# Patient Record
Sex: MALE | Race: WHITE | NOT HISPANIC OR LATINO | URBAN - METROPOLITAN AREA
[De-identification: names, ages, dates, MRNs, and addresses within clinical notes are randomized per-mention and may not be internally consistent; named-entity substitution may affect disease eponyms.]

---

## 2017-04-29 ENCOUNTER — OUTPATIENT (OUTPATIENT)
Dept: OUTPATIENT SERVICES | Facility: HOSPITAL | Age: 67
LOS: 1 days | Discharge: HOME | End: 2017-04-29

## 2017-06-28 DIAGNOSIS — R03.0 ELEVATED BLOOD-PRESSURE READING, WITHOUT DIAGNOSIS OF HYPERTENSION: ICD-10-CM

## 2017-06-28 DIAGNOSIS — E78.5 HYPERLIPIDEMIA, UNSPECIFIED: ICD-10-CM

## 2017-09-01 ENCOUNTER — OUTPATIENT (OUTPATIENT)
Dept: OUTPATIENT SERVICES | Facility: HOSPITAL | Age: 67
LOS: 1 days | Discharge: HOME | End: 2017-09-01

## 2017-09-01 DIAGNOSIS — E11.9 TYPE 2 DIABETES MELLITUS WITHOUT COMPLICATIONS: ICD-10-CM

## 2017-09-01 DIAGNOSIS — E78.5 HYPERLIPIDEMIA, UNSPECIFIED: ICD-10-CM

## 2017-09-01 DIAGNOSIS — I10 ESSENTIAL (PRIMARY) HYPERTENSION: ICD-10-CM

## 2017-10-17 ENCOUNTER — OUTPATIENT (OUTPATIENT)
Dept: OUTPATIENT SERVICES | Facility: HOSPITAL | Age: 67
LOS: 1 days | Discharge: HOME | End: 2017-10-17

## 2017-10-17 DIAGNOSIS — N40.1 BENIGN PROSTATIC HYPERPLASIA WITH LOWER URINARY TRACT SYMPTOMS: ICD-10-CM

## 2018-01-02 ENCOUNTER — OUTPATIENT (OUTPATIENT)
Dept: OUTPATIENT SERVICES | Facility: HOSPITAL | Age: 68
LOS: 1 days | Discharge: HOME | End: 2018-01-02

## 2018-01-02 DIAGNOSIS — E78.5 HYPERLIPIDEMIA, UNSPECIFIED: ICD-10-CM

## 2018-01-02 DIAGNOSIS — E11.9 TYPE 2 DIABETES MELLITUS WITHOUT COMPLICATIONS: ICD-10-CM

## 2018-01-02 DIAGNOSIS — I10 ESSENTIAL (PRIMARY) HYPERTENSION: ICD-10-CM

## 2018-01-21 ENCOUNTER — OUTPATIENT (OUTPATIENT)
Dept: OUTPATIENT SERVICES | Facility: HOSPITAL | Age: 68
LOS: 1 days | Discharge: HOME | End: 2018-01-21

## 2018-01-21 DIAGNOSIS — R10.9 UNSPECIFIED ABDOMINAL PAIN: ICD-10-CM

## 2018-02-07 ENCOUNTER — OUTPATIENT (OUTPATIENT)
Dept: OUTPATIENT SERVICES | Facility: HOSPITAL | Age: 68
LOS: 1 days | Discharge: HOME | End: 2018-02-07

## 2018-02-07 DIAGNOSIS — R10.10 UPPER ABDOMINAL PAIN, UNSPECIFIED: ICD-10-CM

## 2018-05-01 ENCOUNTER — OUTPATIENT (OUTPATIENT)
Dept: OUTPATIENT SERVICES | Facility: HOSPITAL | Age: 68
LOS: 1 days | Discharge: HOME | End: 2018-05-01

## 2018-05-01 DIAGNOSIS — E78.5 HYPERLIPIDEMIA, UNSPECIFIED: ICD-10-CM

## 2018-05-01 DIAGNOSIS — E11.9 TYPE 2 DIABETES MELLITUS WITHOUT COMPLICATIONS: ICD-10-CM

## 2018-05-01 DIAGNOSIS — I10 ESSENTIAL (PRIMARY) HYPERTENSION: ICD-10-CM

## 2018-07-31 ENCOUNTER — APPOINTMENT (OUTPATIENT)
Dept: UROLOGY | Facility: CLINIC | Age: 68
End: 2018-07-31
Payer: MEDICARE

## 2018-07-31 VITALS
DIASTOLIC BLOOD PRESSURE: 82 MMHG | SYSTOLIC BLOOD PRESSURE: 147 MMHG | HEART RATE: 93 BPM | BODY MASS INDEX: 31.07 KG/M2 | HEIGHT: 68 IN | WEIGHT: 205 LBS

## 2018-07-31 LAB
BILIRUB UR QL STRIP: NORMAL
CLARITY UR: CLEAR
COLLECTION METHOD: NORMAL
GLUCOSE UR-MCNC: NORMAL
HCG UR QL: NORMAL EU/DL
HGB UR QL STRIP.AUTO: NORMAL
KETONES UR-MCNC: NORMAL
LEUKOCYTE ESTERASE UR QL STRIP: NORMAL
NITRITE UR QL STRIP: NORMAL
PH UR STRIP: 5
PROT UR STRIP-MCNC: NORMAL
SP GR UR STRIP: 1.01

## 2018-07-31 PROCEDURE — 81003 URINALYSIS AUTO W/O SCOPE: CPT | Mod: QW

## 2018-07-31 PROCEDURE — 99213 OFFICE O/P EST LOW 20 MIN: CPT

## 2018-07-31 RX ORDER — DUTASTERIDE 0.5 MG/1
0.5 CAPSULE, LIQUID FILLED ORAL
Refills: 0 | Status: ACTIVE | COMMUNITY

## 2018-07-31 RX ORDER — EZETIMIBE AND SIMVASTATIN 10; 20 MG/1; MG/1
10-20 TABLET ORAL
Refills: 0 | Status: ACTIVE | COMMUNITY

## 2018-07-31 RX ORDER — GLIMEPIRIDE 4 MG/1
TABLET ORAL
Refills: 0 | Status: ACTIVE | COMMUNITY

## 2018-09-27 ENCOUNTER — OUTPATIENT (OUTPATIENT)
Dept: OUTPATIENT SERVICES | Facility: HOSPITAL | Age: 68
LOS: 1 days | Discharge: HOME | End: 2018-09-27

## 2018-09-27 DIAGNOSIS — E78.5 HYPERLIPIDEMIA, UNSPECIFIED: ICD-10-CM

## 2018-09-27 DIAGNOSIS — I10 ESSENTIAL (PRIMARY) HYPERTENSION: ICD-10-CM

## 2018-09-27 DIAGNOSIS — E11.9 TYPE 2 DIABETES MELLITUS WITHOUT COMPLICATIONS: ICD-10-CM

## 2018-10-05 ENCOUNTER — RX RENEWAL (OUTPATIENT)
Age: 68
End: 2018-10-05

## 2019-01-22 ENCOUNTER — OUTPATIENT (OUTPATIENT)
Dept: OUTPATIENT SERVICES | Facility: HOSPITAL | Age: 69
LOS: 1 days | Discharge: HOME | End: 2019-01-22

## 2019-01-22 DIAGNOSIS — N40.1 BENIGN PROSTATIC HYPERPLASIA WITH LOWER URINARY TRACT SYMPTOMS: ICD-10-CM

## 2019-01-23 LAB — PSA SERPL-MCNC: 1.18 NG/ML

## 2019-02-01 ENCOUNTER — APPOINTMENT (OUTPATIENT)
Dept: UROLOGY | Facility: CLINIC | Age: 69
End: 2019-02-01
Payer: MEDICARE

## 2019-02-01 VITALS
HEART RATE: 100 BPM | BODY MASS INDEX: 31.07 KG/M2 | HEIGHT: 68 IN | SYSTOLIC BLOOD PRESSURE: 137 MMHG | DIASTOLIC BLOOD PRESSURE: 85 MMHG | WEIGHT: 205 LBS

## 2019-02-01 PROCEDURE — 99213 OFFICE O/P EST LOW 20 MIN: CPT

## 2019-02-01 NOTE — ASSESSMENT
[FreeTextEntry1] : This is a 68-year-old male with a history of BPH currently managed on tamsulosin/Avodart. He does have some break through symptoms however, non-bothersome to him at this time. His PSA is 1.18 ng/mL and stable from prior values. Urine testing is negative for blood. At this time, he'll follow up in 6 months for review.

## 2019-02-21 ENCOUNTER — OUTPATIENT (OUTPATIENT)
Dept: OUTPATIENT SERVICES | Facility: HOSPITAL | Age: 69
LOS: 1 days | Discharge: HOME | End: 2019-02-21

## 2019-02-21 DIAGNOSIS — Z79.4 LONG TERM (CURRENT) USE OF INSULIN: ICD-10-CM

## 2019-02-21 DIAGNOSIS — E78.2 MIXED HYPERLIPIDEMIA: ICD-10-CM

## 2019-02-21 DIAGNOSIS — E11.9 TYPE 2 DIABETES MELLITUS WITHOUT COMPLICATIONS: ICD-10-CM

## 2019-02-21 DIAGNOSIS — I10 ESSENTIAL (PRIMARY) HYPERTENSION: ICD-10-CM

## 2019-07-02 ENCOUNTER — OUTPATIENT (OUTPATIENT)
Dept: OUTPATIENT SERVICES | Facility: HOSPITAL | Age: 69
LOS: 1 days | Discharge: HOME | End: 2019-07-02

## 2019-07-02 DIAGNOSIS — E11.9 TYPE 2 DIABETES MELLITUS WITHOUT COMPLICATIONS: ICD-10-CM

## 2019-07-02 DIAGNOSIS — E78.2 MIXED HYPERLIPIDEMIA: ICD-10-CM

## 2019-07-02 DIAGNOSIS — I10 ESSENTIAL (PRIMARY) HYPERTENSION: ICD-10-CM

## 2019-08-01 ENCOUNTER — OUTPATIENT (OUTPATIENT)
Dept: OUTPATIENT SERVICES | Facility: HOSPITAL | Age: 69
LOS: 1 days | Discharge: HOME | End: 2019-08-01

## 2019-08-01 ENCOUNTER — LABORATORY RESULT (OUTPATIENT)
Age: 69
End: 2019-08-01

## 2019-08-01 DIAGNOSIS — Z00.00 ENCOUNTER FOR GENERAL ADULT MEDICAL EXAMINATION WITHOUT ABNORMAL FINDINGS: ICD-10-CM

## 2019-09-24 ENCOUNTER — APPOINTMENT (OUTPATIENT)
Dept: UROLOGY | Facility: CLINIC | Age: 69
End: 2019-09-24
Payer: MEDICARE

## 2019-09-24 VITALS — HEIGHT: 68 IN | WEIGHT: 205 LBS | BODY MASS INDEX: 31.07 KG/M2

## 2019-09-24 PROCEDURE — 99213 OFFICE O/P EST LOW 20 MIN: CPT

## 2019-09-24 NOTE — HISTORY OF PRESENT ILLNESS
[FreeTextEntry1] : 69-year-old with history of BPH and lower urinary tract symptoms currently on tamsulosin 0.4 daily and Avodart 0.5 daily. He has less nocturia than 6 months ago with nocturia x1-2. There is no urinary frequency, no urgency, no gross hematuria, no dysuria. There is no flank pain. Current PSA is 1.44 compared to 1.18 6 months ago.

## 2019-09-24 NOTE — PHYSICAL EXAM
[General Appearance - In No Acute Distress] : no acute distress [Prostate Tenderness] : the prostate was not tender [No Prostate Nodules] : no prostate nodules [Prostate Size ___ (0-4)] : prostate size [unfilled] (scale: 0-4) [] : no respiratory distress [Respiration, Rhythm And Depth] : normal respiratory rhythm and effort [Oriented To Time, Place, And Person] : oriented to person, place, and time [Normal Station and Gait] : the gait and station were normal for the patient's age [Not Anxious] : not anxious

## 2019-09-24 NOTE — REVIEW OF SYSTEMS
[Fever] : no fever [Shortness Of Breath] : no shortness of breath [Chest Pain] : no chest pain [Abdominal Pain] : no abdominal pain [Cough] : no cough [Constipation] : no constipation [Diarrhea] : no diarrhea [Dysuria] : no dysuria [Confused] : no confusion

## 2019-10-18 ENCOUNTER — RX RENEWAL (OUTPATIENT)
Age: 69
End: 2019-10-18

## 2020-02-03 ENCOUNTER — RX RENEWAL (OUTPATIENT)
Age: 70
End: 2020-02-03

## 2020-03-10 ENCOUNTER — LABORATORY RESULT (OUTPATIENT)
Age: 70
End: 2020-03-10

## 2020-03-23 ENCOUNTER — OUTPATIENT (OUTPATIENT)
Dept: OUTPATIENT SERVICES | Facility: HOSPITAL | Age: 70
LOS: 1 days | Discharge: HOME | End: 2020-03-23
Payer: MEDICARE

## 2020-03-23 DIAGNOSIS — J18.9 PNEUMONIA, UNSPECIFIED ORGANISM: ICD-10-CM

## 2020-03-23 DIAGNOSIS — R05 COUGH: ICD-10-CM

## 2020-03-23 PROCEDURE — 71046 X-RAY EXAM CHEST 2 VIEWS: CPT | Mod: 26

## 2020-03-24 ENCOUNTER — APPOINTMENT (OUTPATIENT)
Dept: UROLOGY | Facility: CLINIC | Age: 70
End: 2020-03-24

## 2020-05-08 ENCOUNTER — APPOINTMENT (OUTPATIENT)
Dept: UROLOGY | Facility: CLINIC | Age: 70
End: 2020-05-08
Payer: MEDICARE

## 2020-05-08 VITALS
TEMPERATURE: 97.1 F | HEART RATE: 77 BPM | DIASTOLIC BLOOD PRESSURE: 80 MMHG | BODY MASS INDEX: 31.07 KG/M2 | SYSTOLIC BLOOD PRESSURE: 130 MMHG | WEIGHT: 205 LBS | HEIGHT: 68 IN

## 2020-05-08 PROCEDURE — 99213 OFFICE O/P EST LOW 20 MIN: CPT

## 2020-05-08 NOTE — REVIEW OF SYSTEMS
[Fever] : no fever [Palpitations] : no palpitations [Chest Pain] : no chest pain [Shortness Of Breath] : no shortness of breath [Cough] : no cough [Diarrhea] : no diarrhea [Constipation] : no constipation [Dysuria] : no dysuria [Confused] : no confusion

## 2020-05-08 NOTE — HISTORY OF PRESENT ILLNESS
[FreeTextEntry1] : 70-year-old with history of BPH and lower urinary tract symptoms currently on tamsulosin 0.4 daily and Avodart 0.5 daily. He has nocturia x0-1 but he notices bothersome increased urgency in the daytime. There is no dysuria, no gross hematuria, no flank pain. PSA is 1.26.He drinks a few cups of coffee every more [Urinary Incontinence] : no urinary incontinence [Nocturia] : no nocturia [Straining] : no straining [Hematuria - Gross] : no gross hematuria [Dysuria] : no dysuria [Flank Pain] : no flank pain

## 2020-05-08 NOTE — ASSESSMENT
[FreeTextEntry1] : Some worsening of urinary symptoms especially urgency the patient would like to try alternative to the tamsulosin and will continue Avodart. We'll try rapaflo.He will also decrease caffeinated products

## 2020-05-12 ENCOUNTER — OUTPATIENT (OUTPATIENT)
Dept: OUTPATIENT SERVICES | Facility: HOSPITAL | Age: 70
LOS: 1 days | Discharge: HOME | End: 2020-05-12
Payer: MEDICARE

## 2020-05-12 DIAGNOSIS — R05 COUGH: ICD-10-CM

## 2020-05-12 PROCEDURE — 71046 X-RAY EXAM CHEST 2 VIEWS: CPT | Mod: 26

## 2020-07-24 ENCOUNTER — RX RENEWAL (OUTPATIENT)
Age: 70
End: 2020-07-24

## 2020-09-04 ENCOUNTER — OUTPATIENT (OUTPATIENT)
Dept: OUTPATIENT SERVICES | Facility: HOSPITAL | Age: 70
LOS: 1 days | Discharge: HOME | End: 2020-09-04
Payer: MEDICARE

## 2020-09-04 DIAGNOSIS — R10.84 GENERALIZED ABDOMINAL PAIN: ICD-10-CM

## 2020-09-04 PROCEDURE — 76700 US EXAM ABDOM COMPLETE: CPT | Mod: 26

## 2020-09-04 PROCEDURE — 76856 US EXAM PELVIC COMPLETE: CPT | Mod: 26

## 2020-10-19 ENCOUNTER — RX RENEWAL (OUTPATIENT)
Age: 70
End: 2020-10-19

## 2020-11-04 LAB
PSA FREE FLD-MCNC: 18 %
PSA FREE SERPL-MCNC: 0.27 NG/ML
PSA SERPL-MCNC: 1.47 NG/ML

## 2020-11-13 ENCOUNTER — APPOINTMENT (OUTPATIENT)
Dept: UROLOGY | Facility: CLINIC | Age: 70
End: 2020-11-13
Payer: MEDICARE

## 2020-11-13 VITALS — WEIGHT: 205 LBS | HEIGHT: 68 IN | BODY MASS INDEX: 31.07 KG/M2 | TEMPERATURE: 97 F

## 2020-11-13 PROCEDURE — 99213 OFFICE O/P EST LOW 20 MIN: CPT

## 2020-11-13 NOTE — PHYSICAL EXAM
[General Appearance - In No Acute Distress] : no acute distress [Abdomen Soft] : soft [Abdomen Tenderness] : non-tender [Costovertebral Angle Tenderness] : no ~M costovertebral angle tenderness [Prostate Tenderness] : the prostate was not tender [No Prostate Nodules] : no prostate nodules [Prostate Size ___ (0-4)] : prostate size [unfilled] (scale: 0-4) [Oriented To Time, Place, And Person] : oriented to person, place, and time [Not Anxious] : not anxious [Normal Station and Gait] : the gait and station were normal for the patient's age

## 2020-11-13 NOTE — HISTORY OF PRESENT ILLNESS
[FreeTextEntry1] : Patient with history of BPH and lower urinary tract symptoms unchanged from last visit while on Avodart and rapaflo 8.  His nocturia x1, good urinary flow, no change daytime frequency or urgency. No hematuria, no dysuria. PSA is 1.5 which is stable

## 2020-11-13 NOTE — REVIEW OF SYSTEMS
[Feeling Poorly] : not feeling poorly [Feeling Tired] : not feeling tired [Discharge From Eyes] : no purulent discharge from the eyes [Nasal Discharge] : no nasal discharge [Sore Throat] : no sore throat [Chest Pain] : no chest pain [Cough] : no cough [Wheezing] : no wheezing [Constipation] : no constipation [Diarrhea] : no diarrhea [Dysuria] : no dysuria

## 2020-12-08 ENCOUNTER — RX RENEWAL (OUTPATIENT)
Age: 70
End: 2020-12-08

## 2021-01-15 ENCOUNTER — RX RENEWAL (OUTPATIENT)
Age: 71
End: 2021-01-15

## 2021-04-19 ENCOUNTER — RX RENEWAL (OUTPATIENT)
Age: 71
End: 2021-04-19

## 2021-05-11 ENCOUNTER — OUTPATIENT (OUTPATIENT)
Dept: OUTPATIENT SERVICES | Facility: HOSPITAL | Age: 71
LOS: 1 days | Discharge: HOME | End: 2021-05-11
Payer: MEDICARE

## 2021-05-11 DIAGNOSIS — R22.32 LOCALIZED SWELLING, MASS AND LUMP, LEFT UPPER LIMB: ICD-10-CM

## 2021-05-11 PROCEDURE — 73130 X-RAY EXAM OF HAND: CPT | Mod: 26,LT

## 2021-05-12 ENCOUNTER — INPATIENT (INPATIENT)
Facility: HOSPITAL | Age: 71
LOS: 2 days | Discharge: HOME | End: 2021-05-15
Attending: SURGERY | Admitting: SURGERY
Payer: MEDICARE

## 2021-05-12 VITALS
TEMPERATURE: 97 F | SYSTOLIC BLOOD PRESSURE: 157 MMHG | DIASTOLIC BLOOD PRESSURE: 87 MMHG | OXYGEN SATURATION: 99 % | WEIGHT: 184.97 LBS | HEART RATE: 105 BPM | RESPIRATION RATE: 18 BRPM

## 2021-05-12 DIAGNOSIS — Z98.890 OTHER SPECIFIED POSTPROCEDURAL STATES: Chronic | ICD-10-CM

## 2021-05-12 LAB
ALBUMIN SERPL ELPH-MCNC: 4.9 G/DL — SIGNIFICANT CHANGE UP (ref 3.5–5.2)
ALP SERPL-CCNC: 63 U/L — SIGNIFICANT CHANGE UP (ref 30–115)
ALT FLD-CCNC: 24 U/L — SIGNIFICANT CHANGE UP (ref 0–41)
ANION GAP SERPL CALC-SCNC: 16 MMOL/L — HIGH (ref 7–14)
APTT BLD: 28.5 SEC — SIGNIFICANT CHANGE UP (ref 27–39.2)
AST SERPL-CCNC: 23 U/L — SIGNIFICANT CHANGE UP (ref 0–41)
BASOPHILS # BLD AUTO: 0.01 K/UL — SIGNIFICANT CHANGE UP (ref 0–0.2)
BASOPHILS NFR BLD AUTO: 0.1 % — SIGNIFICANT CHANGE UP (ref 0–1)
BILIRUB DIRECT SERPL-MCNC: 0.2 MG/DL — SIGNIFICANT CHANGE UP (ref 0–0.2)
BILIRUB SERPL-MCNC: 1.1 MG/DL — SIGNIFICANT CHANGE UP (ref 0.2–1.2)
BLD GP AB SCN SERPL QL: SIGNIFICANT CHANGE UP
BUN SERPL-MCNC: 21 MG/DL — HIGH (ref 10–20)
CALCIUM SERPL-MCNC: 10.1 MG/DL — SIGNIFICANT CHANGE UP (ref 8.5–10.1)
CHLORIDE SERPL-SCNC: 100 MMOL/L — SIGNIFICANT CHANGE UP (ref 98–110)
CO2 SERPL-SCNC: 24 MMOL/L — SIGNIFICANT CHANGE UP (ref 17–32)
CREAT SERPL-MCNC: 1.4 MG/DL — SIGNIFICANT CHANGE UP (ref 0.7–1.5)
EOSINOPHIL # BLD AUTO: 0 K/UL — SIGNIFICANT CHANGE UP (ref 0–0.7)
EOSINOPHIL NFR BLD AUTO: 0 % — SIGNIFICANT CHANGE UP (ref 0–8)
GLUCOSE SERPL-MCNC: 223 MG/DL — HIGH (ref 70–99)
HCT VFR BLD CALC: 47.9 % — SIGNIFICANT CHANGE UP (ref 42–52)
HGB BLD-MCNC: 16.4 G/DL — SIGNIFICANT CHANGE UP (ref 14–18)
IMM GRANULOCYTES NFR BLD AUTO: 0.5 % — HIGH (ref 0.1–0.3)
INR BLD: 1.2 RATIO — SIGNIFICANT CHANGE UP (ref 0.65–1.3)
LACTATE SERPL-SCNC: 2.5 MMOL/L — HIGH (ref 0.7–2)
LIDOCAIN IGE QN: 23 U/L — SIGNIFICANT CHANGE UP (ref 7–60)
LYMPHOCYTES # BLD AUTO: 1.1 K/UL — LOW (ref 1.2–3.4)
LYMPHOCYTES # BLD AUTO: 7.3 % — LOW (ref 20.5–51.1)
MAGNESIUM SERPL-MCNC: 1.8 MG/DL — SIGNIFICANT CHANGE UP (ref 1.8–2.4)
MCHC RBC-ENTMCNC: 28.6 PG — SIGNIFICANT CHANGE UP (ref 27–31)
MCHC RBC-ENTMCNC: 34.2 G/DL — SIGNIFICANT CHANGE UP (ref 32–37)
MCV RBC AUTO: 83.4 FL — SIGNIFICANT CHANGE UP (ref 80–94)
MONOCYTES # BLD AUTO: 0.85 K/UL — HIGH (ref 0.1–0.6)
MONOCYTES NFR BLD AUTO: 5.7 % — SIGNIFICANT CHANGE UP (ref 1.7–9.3)
NEUTROPHILS # BLD AUTO: 12.96 K/UL — HIGH (ref 1.4–6.5)
NEUTROPHILS NFR BLD AUTO: 86.4 % — HIGH (ref 42.2–75.2)
NRBC # BLD: 0 /100 WBCS — SIGNIFICANT CHANGE UP (ref 0–0)
PLATELET # BLD AUTO: 211 K/UL — SIGNIFICANT CHANGE UP (ref 130–400)
POTASSIUM SERPL-MCNC: 4.2 MMOL/L — SIGNIFICANT CHANGE UP (ref 3.5–5)
POTASSIUM SERPL-SCNC: 4.2 MMOL/L — SIGNIFICANT CHANGE UP (ref 3.5–5)
PROT SERPL-MCNC: 7.6 G/DL — SIGNIFICANT CHANGE UP (ref 6–8)
PROTHROM AB SERPL-ACNC: 13.8 SEC — HIGH (ref 9.95–12.87)
RBC # BLD: 5.74 M/UL — SIGNIFICANT CHANGE UP (ref 4.7–6.1)
RBC # FLD: 13.2 % — SIGNIFICANT CHANGE UP (ref 11.5–14.5)
SARS-COV-2 RNA SPEC QL NAA+PROBE: SIGNIFICANT CHANGE UP
SODIUM SERPL-SCNC: 140 MMOL/L — SIGNIFICANT CHANGE UP (ref 135–146)
TROPONIN T SERPL-MCNC: <0.01 NG/ML — SIGNIFICANT CHANGE UP
WBC # BLD: 15 K/UL — HIGH (ref 4.8–10.8)
WBC # FLD AUTO: 15 K/UL — HIGH (ref 4.8–10.8)

## 2021-05-12 PROCEDURE — 93010 ELECTROCARDIOGRAM REPORT: CPT

## 2021-05-12 PROCEDURE — 76705 ECHO EXAM OF ABDOMEN: CPT | Mod: 26

## 2021-05-12 PROCEDURE — 99223 1ST HOSP IP/OBS HIGH 75: CPT

## 2021-05-12 PROCEDURE — 99285 EMERGENCY DEPT VISIT HI MDM: CPT | Mod: CS

## 2021-05-12 PROCEDURE — 71046 X-RAY EXAM CHEST 2 VIEWS: CPT | Mod: 26

## 2021-05-12 PROCEDURE — 74177 CT ABD & PELVIS W/CONTRAST: CPT | Mod: 26,MA

## 2021-05-12 RX ORDER — ACETAMINOPHEN 500 MG
650 TABLET ORAL EVERY 6 HOURS
Refills: 0 | Status: DISCONTINUED | OUTPATIENT
Start: 2021-05-12 | End: 2021-05-13

## 2021-05-12 RX ORDER — PIPERACILLIN AND TAZOBACTAM 4; .5 G/20ML; G/20ML
3.38 INJECTION, POWDER, LYOPHILIZED, FOR SOLUTION INTRAVENOUS ONCE
Refills: 0 | Status: COMPLETED | OUTPATIENT
Start: 2021-05-12 | End: 2021-05-12

## 2021-05-12 RX ORDER — SODIUM CHLORIDE 9 MG/ML
1000 INJECTION, SOLUTION INTRAVENOUS
Refills: 0 | Status: DISCONTINUED | OUTPATIENT
Start: 2021-05-12 | End: 2021-05-14

## 2021-05-12 RX ORDER — SODIUM CHLORIDE 9 MG/ML
1000 INJECTION, SOLUTION INTRAVENOUS ONCE
Refills: 0 | Status: COMPLETED | OUTPATIENT
Start: 2021-05-12 | End: 2021-05-12

## 2021-05-12 RX ORDER — CEFOTETAN DISODIUM 1 G
1 VIAL (EA) INJECTION ONCE
Refills: 0 | Status: COMPLETED | OUTPATIENT
Start: 2021-05-12 | End: 2021-05-12

## 2021-05-12 RX ORDER — HEPARIN SODIUM 5000 [USP'U]/ML
5000 INJECTION INTRAVENOUS; SUBCUTANEOUS EVERY 8 HOURS
Refills: 0 | Status: DISCONTINUED | OUTPATIENT
Start: 2021-05-12 | End: 2021-05-14

## 2021-05-12 RX ORDER — DEXTROSE 50 % IN WATER 50 %
12.5 SYRINGE (ML) INTRAVENOUS ONCE
Refills: 0 | Status: DISCONTINUED | OUTPATIENT
Start: 2021-05-12 | End: 2021-05-14

## 2021-05-12 RX ORDER — DEXTROSE 50 % IN WATER 50 %
25 SYRINGE (ML) INTRAVENOUS ONCE
Refills: 0 | Status: DISCONTINUED | OUTPATIENT
Start: 2021-05-12 | End: 2021-05-14

## 2021-05-12 RX ORDER — FINASTERIDE 5 MG/1
5 TABLET, FILM COATED ORAL DAILY
Refills: 0 | Status: DISCONTINUED | OUTPATIENT
Start: 2021-05-12 | End: 2021-05-14

## 2021-05-12 RX ORDER — OXYCODONE HYDROCHLORIDE 5 MG/1
5 TABLET ORAL EVERY 6 HOURS
Refills: 0 | Status: DISCONTINUED | OUTPATIENT
Start: 2021-05-12 | End: 2021-05-13

## 2021-05-12 RX ORDER — ENOXAPARIN SODIUM 100 MG/ML
40 INJECTION SUBCUTANEOUS DAILY
Refills: 0 | Status: DISCONTINUED | OUTPATIENT
Start: 2021-05-12 | End: 2021-05-12

## 2021-05-12 RX ORDER — ATORVASTATIN CALCIUM 80 MG/1
80 TABLET, FILM COATED ORAL AT BEDTIME
Refills: 0 | Status: DISCONTINUED | OUTPATIENT
Start: 2021-05-12 | End: 2021-05-14

## 2021-05-12 RX ORDER — PIPERACILLIN AND TAZOBACTAM 4; .5 G/20ML; G/20ML
3.38 INJECTION, POWDER, LYOPHILIZED, FOR SOLUTION INTRAVENOUS EVERY 8 HOURS
Refills: 0 | Status: DISCONTINUED | OUTPATIENT
Start: 2021-05-12 | End: 2021-05-14

## 2021-05-12 RX ORDER — GLUCAGON INJECTION, SOLUTION 0.5 MG/.1ML
1 INJECTION, SOLUTION SUBCUTANEOUS ONCE
Refills: 0 | Status: DISCONTINUED | OUTPATIENT
Start: 2021-05-12 | End: 2021-05-14

## 2021-05-12 RX ORDER — INSULIN LISPRO 100/ML
VIAL (ML) SUBCUTANEOUS
Refills: 0 | Status: DISCONTINUED | OUTPATIENT
Start: 2021-05-12 | End: 2021-05-13

## 2021-05-12 RX ORDER — MORPHINE SULFATE 50 MG/1
4 CAPSULE, EXTENDED RELEASE ORAL ONCE
Refills: 0 | Status: DISCONTINUED | OUTPATIENT
Start: 2021-05-12 | End: 2021-05-12

## 2021-05-12 RX ORDER — TAMSULOSIN HYDROCHLORIDE 0.4 MG/1
0.8 CAPSULE ORAL AT BEDTIME
Refills: 0 | Status: DISCONTINUED | OUTPATIENT
Start: 2021-05-12 | End: 2021-05-14

## 2021-05-12 RX ORDER — METOPROLOL TARTRATE 50 MG
25 TABLET ORAL DAILY
Refills: 0 | Status: DISCONTINUED | OUTPATIENT
Start: 2021-05-12 | End: 2021-05-13

## 2021-05-12 RX ORDER — ICOSAPENT ETHYL 500 MG/1
0 CAPSULE, LIQUID FILLED ORAL
Qty: 0 | Refills: 0 | DISCHARGE

## 2021-05-12 RX ORDER — LOSARTAN POTASSIUM 100 MG/1
100 TABLET, FILM COATED ORAL DAILY
Refills: 0 | Status: DISCONTINUED | OUTPATIENT
Start: 2021-05-12 | End: 2021-05-14

## 2021-05-12 RX ORDER — DEXTROSE 50 % IN WATER 50 %
15 SYRINGE (ML) INTRAVENOUS ONCE
Refills: 0 | Status: DISCONTINUED | OUTPATIENT
Start: 2021-05-12 | End: 2021-05-14

## 2021-05-12 RX ORDER — ONDANSETRON 8 MG/1
4 TABLET, FILM COATED ORAL ONCE
Refills: 0 | Status: COMPLETED | OUTPATIENT
Start: 2021-05-12 | End: 2021-05-12

## 2021-05-12 RX ORDER — PANTOPRAZOLE SODIUM 20 MG/1
40 TABLET, DELAYED RELEASE ORAL DAILY
Refills: 0 | Status: DISCONTINUED | OUTPATIENT
Start: 2021-05-12 | End: 2021-05-14

## 2021-05-12 RX ADMIN — SODIUM CHLORIDE 1000 MILLILITER(S): 9 INJECTION, SOLUTION INTRAVENOUS at 17:53

## 2021-05-12 RX ADMIN — ATORVASTATIN CALCIUM 80 MILLIGRAM(S): 80 TABLET, FILM COATED ORAL at 22:29

## 2021-05-12 RX ADMIN — SODIUM CHLORIDE 1000 MILLILITER(S): 9 INJECTION, SOLUTION INTRAVENOUS at 12:11

## 2021-05-12 RX ADMIN — Medication 100 GRAM(S): at 16:22

## 2021-05-12 RX ADMIN — PIPERACILLIN AND TAZOBACTAM 200 GRAM(S): 4; .5 INJECTION, POWDER, LYOPHILIZED, FOR SOLUTION INTRAVENOUS at 22:27

## 2021-05-12 RX ADMIN — TAMSULOSIN HYDROCHLORIDE 0.8 MILLIGRAM(S): 0.4 CAPSULE ORAL at 23:20

## 2021-05-12 RX ADMIN — HEPARIN SODIUM 5000 UNIT(S): 5000 INJECTION INTRAVENOUS; SUBCUTANEOUS at 22:29

## 2021-05-12 RX ADMIN — ONDANSETRON 4 MILLIGRAM(S): 8 TABLET, FILM COATED ORAL at 12:09

## 2021-05-12 RX ADMIN — MORPHINE SULFATE 4 MILLIGRAM(S): 50 CAPSULE, EXTENDED RELEASE ORAL at 15:32

## 2021-05-12 NOTE — H&P ADULT - HISTORY OF PRESENT ILLNESS
HPI: 71y Male  HPI:  70 yo M with pmhx of HTN, DM, known cholelithiasis, s/p ventral hernia repair with mesh (> 10 years ago in [place]) presenting for abdominal pain. Patient states abdominal pain started yesterday after rosita salami. His pain is described as sharp, constant, located to his RUQ without radiation associated with nausea or vomiting. Denies similar symptoms in the past and denies alleviating factors. No cp, sob, fever, chills, diarrhea, back pain, urinary symptoms, headache, dizziness,  paresthesias, or weakness.

## 2021-05-12 NOTE — H&P ADULT - NSHPREVIEWOFSYSTEMS_GEN_ALL_CORE
ROS:    REVIEW OF SYSTEMS    [ ] A ten-point review of systems was otherwise negative except as noted.  [x] Due to altered mental status/intubation, subjective information were not able to be obtained from the patient. History was obtained, to the extent possible, from review of the chart and collateral sources of information.

## 2021-05-12 NOTE — ED PROVIDER NOTE - OBJECTIVE STATEMENT
72 yo M with pmhx of HTN, DM, gallstones, ventral hernia s/p repair presenting for evaluation of constant, achy, non-radiating RUQ abdominal pain x 2 days. Symptoms are moderate. No alleviating/aggravating factors. Reports multiple episodes of vomiting. Symptoms worse after eating. No alleviating factors. No cp, sob, fever, chills, diarrhea, back pain, urinary symptoms, headache, dizziness,  paresthesias, or weakness.
Nitrazine Positive/Fern Positive

## 2021-05-12 NOTE — H&P ADULT - NSHPPHYSICALEXAM_GEN_ALL_CORE
VITAL SIGNS, INS/OUTS (last 24 hours):  --------------------------------------------------------------------------------------  ICU Vital Signs Last 24 Hrs  T(C): 36.7 (12 May 2021 20:14), Max: 36.7 (12 May 2021 20:14)  T(F): 98.1 (12 May 2021 20:14), Max: 98.1 (12 May 2021 20:14)  HR: 116 (12 May 2021 20:14) (105 - 116)  BP: 144/71 (12 May 2021 20:14) (144/71 - 157/87)  BP(mean): --  ABP: --  ABP(mean): --  RR: 18 (12 May 2021 20:14) (18 - 18)  SpO2: 96% (12 May 2021 20:14) (96% - 99%)    I&O's Summary    --------------------------------------------------------------------------------------  PHYSICAL EXAM  General: NAD, AAOx3, calm and cooperative  HEENT: NCAT, FELICE, EOMI, Trachea ML, Neck supple  Cardiac: RRR S1, S2, no Murmurs, rubs or gallops  Respiratory: CTAB, normal respiratory effort  Abdomen: Soft, non-distended, RUQ tenderness, no rebound or guarding  Neuro: Sensation grossly intact and equal throughout  Vascular: Pulses 2+ throughout, extremities well perfused  Skin: Warm/dry, normal color, no jaundice

## 2021-05-12 NOTE — H&P ADULT - ATTENDING COMMENTS
cholelithiasis and cholecystitis   medical risk stratification   for lap keith possible open   h/o large VENTRAL HERNIA REPAIR WITH MESH

## 2021-05-12 NOTE — ED PROVIDER NOTE - PROGRESS NOTE DETAILS
susana - s/o to Dr. Farrell. Acute cholecystitis. JINA Starkey d/w surgery, will come eval. Awaiting surgery evaluation. Pt s/o to me by Dr. Mckeon, pt in ER with RUQ pain.  wbc 15.  RUQ sono and CT abd c/w cholecystitis, pt given iv abx, seen by surgery, admitted to surgical service for continued care.

## 2021-05-12 NOTE — H&P ADULT - ASSESSMENT
ASSESSMENT:  71y Male with PMH of HTN, HLD, BPH, sxh of ventral hernia repair with mesh, presented to the ED with abdominal pain. RUQ tenderness. WBC 15, tbili 1.1 d bili 0.2, lactate 2.5, US showing gb qwall 4.7mm, CBD 6mm. CT showing cholelithiasis with surrounding inflammatory changes and a cystic duct stone.     PLAN:   Admit surgery  NPO  IVF  IV abx - zosyn  Pre-op optimization  Medical clearance  Echo        Above plan discussed with Dr. Chen , patient, and [facility] team    --------------------------------------------------------------------------------------    05-12-21 @ 20:16

## 2021-05-12 NOTE — H&P ADULT - NSHPLABSRESULTS_GEN_ALL_CORE
--------------------------------------------------------------------------------------  Labs:  CAPILLARY BLOOD GLUCOSE                              16.4   15.00 )-----------( 211      ( 12 May 2021 12:08 )             47.9       71y  05-12    140  |  100  |  21<H>  ----------------------------<  223<H>  4.2   |  24  |  1.4      Calcium, Total Serum: 10.1 mg/dL (05-12-21 @ 12:08)      LFTs:             x    | x    | x        ------------------[x       ( 12 May 2021 15:18 )  x    | 0.2  | x           Lipase:x      Amylase:x         Male    Coags:    CARDIAC MARKERS ( 12 May 2021 12:08 )  x     / <0.01 ng/mL / x     / x     / x              --------------------------------------------------------------------------------------  RADIOLOGY & ADDITIONAL STUDIES:  < from: CT Abdomen and Pelvis w/ IV Cont (05.12.21 @ 14:39) >    IMPRESSION:    Findings consistent with acute cholecystitis. Distended gallbladder with surrounding inflammation. Cholelithiasis and stones within the cystic duct.      < end of copied text >    < from: US Abdomen Upper Quadrant Right (05.12.21 @ 13:10) >    IMPRESSION:    Hepatic steatosis.    Cholecystitis.    < end of copied text >

## 2021-05-12 NOTE — ED ADULT NURSE REASSESSMENT NOTE - NS ED NURSE REASSESS COMMENT FT1
2020: report endorsed to DORITA Godinez. Pt is to receive Pipercillin . RN advised RN Macari medication was not available as of yet.
since yesterday/flu-like symptoms

## 2021-05-13 DIAGNOSIS — Z02.9 ENCOUNTER FOR ADMINISTRATIVE EXAMINATIONS, UNSPECIFIED: ICD-10-CM

## 2021-05-13 LAB
A1C WITH ESTIMATED AVERAGE GLUCOSE RESULT: 7.1 % — HIGH (ref 4–5.6)
ALBUMIN SERPL ELPH-MCNC: 3.7 G/DL — SIGNIFICANT CHANGE UP (ref 3.5–5.2)
ALP SERPL-CCNC: 57 U/L — SIGNIFICANT CHANGE UP (ref 30–115)
ALT FLD-CCNC: 17 U/L — SIGNIFICANT CHANGE UP (ref 0–41)
ANION GAP SERPL CALC-SCNC: 13 MMOL/L — SIGNIFICANT CHANGE UP (ref 7–14)
ANION GAP SERPL CALC-SCNC: 15 MMOL/L — HIGH (ref 7–14)
AST SERPL-CCNC: 20 U/L — SIGNIFICANT CHANGE UP (ref 0–41)
BASOPHILS # BLD AUTO: 0.02 K/UL — SIGNIFICANT CHANGE UP (ref 0–0.2)
BASOPHILS # BLD AUTO: 0.02 K/UL — SIGNIFICANT CHANGE UP (ref 0–0.2)
BASOPHILS NFR BLD AUTO: 0.1 % — SIGNIFICANT CHANGE UP (ref 0–1)
BASOPHILS NFR BLD AUTO: 0.1 % — SIGNIFICANT CHANGE UP (ref 0–1)
BILIRUB DIRECT SERPL-MCNC: 0.5 MG/DL — HIGH (ref 0–0.2)
BILIRUB INDIRECT FLD-MCNC: 1.2 MG/DL — SIGNIFICANT CHANGE UP (ref 0.2–1.2)
BILIRUB SERPL-MCNC: 1.7 MG/DL — HIGH (ref 0.2–1.2)
BUN SERPL-MCNC: 16 MG/DL — SIGNIFICANT CHANGE UP (ref 10–20)
BUN SERPL-MCNC: 23 MG/DL — HIGH (ref 10–20)
CALCIUM SERPL-MCNC: 8.7 MG/DL — SIGNIFICANT CHANGE UP (ref 8.5–10.1)
CALCIUM SERPL-MCNC: 9 MG/DL — SIGNIFICANT CHANGE UP (ref 8.5–10.1)
CHLORIDE SERPL-SCNC: 96 MMOL/L — LOW (ref 98–110)
CHLORIDE SERPL-SCNC: 99 MMOL/L — SIGNIFICANT CHANGE UP (ref 98–110)
CO2 SERPL-SCNC: 23 MMOL/L — SIGNIFICANT CHANGE UP (ref 17–32)
CO2 SERPL-SCNC: 24 MMOL/L — SIGNIFICANT CHANGE UP (ref 17–32)
COVID-19 SPIKE DOMAIN AB INTERP: POSITIVE
COVID-19 SPIKE DOMAIN ANTIBODY RESULT: >250 U/ML — HIGH
CREAT SERPL-MCNC: 1.2 MG/DL — SIGNIFICANT CHANGE UP (ref 0.7–1.5)
CREAT SERPL-MCNC: 1.4 MG/DL — SIGNIFICANT CHANGE UP (ref 0.7–1.5)
EOSINOPHIL # BLD AUTO: 0 K/UL — SIGNIFICANT CHANGE UP (ref 0–0.7)
EOSINOPHIL # BLD AUTO: 0 K/UL — SIGNIFICANT CHANGE UP (ref 0–0.7)
EOSINOPHIL NFR BLD AUTO: 0 % — SIGNIFICANT CHANGE UP (ref 0–8)
EOSINOPHIL NFR BLD AUTO: 0 % — SIGNIFICANT CHANGE UP (ref 0–8)
ESTIMATED AVERAGE GLUCOSE: 157 MG/DL — HIGH (ref 68–114)
GLUCOSE BLDC GLUCOMTR-MCNC: 190 MG/DL — HIGH (ref 70–99)
GLUCOSE BLDC GLUCOMTR-MCNC: 236 MG/DL — HIGH (ref 70–99)
GLUCOSE BLDC GLUCOMTR-MCNC: 298 MG/DL — HIGH (ref 70–99)
GLUCOSE BLDC GLUCOMTR-MCNC: 302 MG/DL — HIGH (ref 70–99)
GLUCOSE SERPL-MCNC: 186 MG/DL — HIGH (ref 70–99)
GLUCOSE SERPL-MCNC: 248 MG/DL — HIGH (ref 70–99)
HCT VFR BLD CALC: 41.4 % — LOW (ref 42–52)
HCT VFR BLD CALC: 45.5 % — SIGNIFICANT CHANGE UP (ref 42–52)
HCV AB S/CO SERPL IA: 0.04 COI — SIGNIFICANT CHANGE UP
HCV AB SERPL-IMP: SIGNIFICANT CHANGE UP
HGB BLD-MCNC: 14.1 G/DL — SIGNIFICANT CHANGE UP (ref 14–18)
HGB BLD-MCNC: 15.3 G/DL — SIGNIFICANT CHANGE UP (ref 14–18)
IMM GRANULOCYTES NFR BLD AUTO: 0.5 % — HIGH (ref 0.1–0.3)
IMM GRANULOCYTES NFR BLD AUTO: 1.5 % — HIGH (ref 0.1–0.3)
LACTATE SERPL-SCNC: 1.1 MMOL/L — SIGNIFICANT CHANGE UP (ref 0.7–2)
LACTATE SERPL-SCNC: 1.5 MMOL/L — SIGNIFICANT CHANGE UP (ref 0.7–2)
LYMPHOCYTES # BLD AUTO: 0.95 K/UL — LOW (ref 1.2–3.4)
LYMPHOCYTES # BLD AUTO: 1.14 K/UL — LOW (ref 1.2–3.4)
LYMPHOCYTES # BLD AUTO: 6.8 % — LOW (ref 20.5–51.1)
LYMPHOCYTES # BLD AUTO: 7.7 % — LOW (ref 20.5–51.1)
MAGNESIUM SERPL-MCNC: 1.6 MG/DL — LOW (ref 1.8–2.4)
MAGNESIUM SERPL-MCNC: 2.2 MG/DL — SIGNIFICANT CHANGE UP (ref 1.8–2.4)
MCHC RBC-ENTMCNC: 27.7 PG — SIGNIFICANT CHANGE UP (ref 27–31)
MCHC RBC-ENTMCNC: 28.3 PG — SIGNIFICANT CHANGE UP (ref 27–31)
MCHC RBC-ENTMCNC: 33.6 G/DL — SIGNIFICANT CHANGE UP (ref 32–37)
MCHC RBC-ENTMCNC: 34.1 G/DL — SIGNIFICANT CHANGE UP (ref 32–37)
MCV RBC AUTO: 82.4 FL — SIGNIFICANT CHANGE UP (ref 80–94)
MCV RBC AUTO: 83 FL — SIGNIFICANT CHANGE UP (ref 80–94)
MONOCYTES # BLD AUTO: 0.73 K/UL — HIGH (ref 0.1–0.6)
MONOCYTES # BLD AUTO: 0.92 K/UL — HIGH (ref 0.1–0.6)
MONOCYTES NFR BLD AUTO: 5.2 % — SIGNIFICANT CHANGE UP (ref 1.7–9.3)
MONOCYTES NFR BLD AUTO: 6.2 % — SIGNIFICANT CHANGE UP (ref 1.7–9.3)
MRSA PCR RESULT.: NEGATIVE — SIGNIFICANT CHANGE UP
NEUTROPHILS # BLD AUTO: 12.08 K/UL — HIGH (ref 1.4–6.5)
NEUTROPHILS # BLD AUTO: 12.6 K/UL — HIGH (ref 1.4–6.5)
NEUTROPHILS NFR BLD AUTO: 85.5 % — HIGH (ref 42.2–75.2)
NEUTROPHILS NFR BLD AUTO: 86.4 % — HIGH (ref 42.2–75.2)
NRBC # BLD: 0 /100 WBCS — SIGNIFICANT CHANGE UP (ref 0–0)
NRBC # BLD: 0 /100 WBCS — SIGNIFICANT CHANGE UP (ref 0–0)
PHOSPHATE SERPL-MCNC: 2.7 MG/DL — SIGNIFICANT CHANGE UP (ref 2.1–4.9)
PHOSPHATE SERPL-MCNC: 3.1 MG/DL — SIGNIFICANT CHANGE UP (ref 2.1–4.9)
PLATELET # BLD AUTO: 145 K/UL — SIGNIFICANT CHANGE UP (ref 130–400)
PLATELET # BLD AUTO: 164 K/UL — SIGNIFICANT CHANGE UP (ref 130–400)
POTASSIUM SERPL-MCNC: 3.4 MMOL/L — LOW (ref 3.5–5)
POTASSIUM SERPL-MCNC: 3.5 MMOL/L — SIGNIFICANT CHANGE UP (ref 3.5–5)
POTASSIUM SERPL-SCNC: 3.4 MMOL/L — LOW (ref 3.5–5)
POTASSIUM SERPL-SCNC: 3.5 MMOL/L — SIGNIFICANT CHANGE UP (ref 3.5–5)
PROT SERPL-MCNC: 5.7 G/DL — LOW (ref 6–8)
RBC # BLD: 4.99 M/UL — SIGNIFICANT CHANGE UP (ref 4.7–6.1)
RBC # BLD: 5.52 M/UL — SIGNIFICANT CHANGE UP (ref 4.7–6.1)
RBC # FLD: 13.2 % — SIGNIFICANT CHANGE UP (ref 11.5–14.5)
RBC # FLD: 13.3 % — SIGNIFICANT CHANGE UP (ref 11.5–14.5)
SARS-COV-2 IGG+IGM SERPL QL IA: >250 U/ML — HIGH
SARS-COV-2 IGG+IGM SERPL QL IA: POSITIVE
SODIUM SERPL-SCNC: 133 MMOL/L — LOW (ref 135–146)
SODIUM SERPL-SCNC: 137 MMOL/L — SIGNIFICANT CHANGE UP (ref 135–146)
WBC # BLD: 13.99 K/UL — HIGH (ref 4.8–10.8)
WBC # BLD: 14.76 K/UL — HIGH (ref 4.8–10.8)
WBC # FLD AUTO: 13.99 K/UL — HIGH (ref 4.8–10.8)
WBC # FLD AUTO: 14.76 K/UL — HIGH (ref 4.8–10.8)

## 2021-05-13 PROCEDURE — 99222 1ST HOSP IP/OBS MODERATE 55: CPT

## 2021-05-13 PROCEDURE — 93970 EXTREMITY STUDY: CPT | Mod: 26

## 2021-05-13 PROCEDURE — 93306 TTE W/DOPPLER COMPLETE: CPT | Mod: 26

## 2021-05-13 PROCEDURE — 99232 SBSQ HOSP IP/OBS MODERATE 35: CPT

## 2021-05-13 RX ORDER — CHLORHEXIDINE GLUCONATE 213 G/1000ML
1 SOLUTION TOPICAL EVERY 12 HOURS
Refills: 0 | Status: COMPLETED | OUTPATIENT
Start: 2021-05-13 | End: 2021-05-14

## 2021-05-13 RX ORDER — METFORMIN HYDROCHLORIDE 850 MG/1
0 TABLET ORAL
Qty: 0 | Refills: 1 | DISCHARGE

## 2021-05-13 RX ORDER — DAPAGLIFLOZIN 10 MG/1
0 TABLET, FILM COATED ORAL
Qty: 0 | Refills: 0 | DISCHARGE

## 2021-05-13 RX ORDER — CANDESARTAN CILEXETIL 8 MG/1
0 TABLET ORAL
Qty: 0 | Refills: 0 | DISCHARGE

## 2021-05-13 RX ORDER — DUTASTERIDE 0.5 MG/1
0 CAPSULE, LIQUID FILLED ORAL
Qty: 0 | Refills: 0 | DISCHARGE

## 2021-05-13 RX ORDER — SILODOSIN 4 MG/1
0 CAPSULE ORAL
Qty: 0 | Refills: 0 | DISCHARGE

## 2021-05-13 RX ORDER — ROSUVASTATIN CALCIUM 5 MG/1
0 TABLET ORAL
Qty: 0 | Refills: 0 | DISCHARGE

## 2021-05-13 RX ORDER — METOPROLOL TARTRATE 50 MG
0 TABLET ORAL
Qty: 0 | Refills: 2 | DISCHARGE

## 2021-05-13 RX ORDER — METOPROLOL TARTRATE 50 MG
100 TABLET ORAL DAILY
Refills: 0 | Status: DISCONTINUED | OUTPATIENT
Start: 2021-05-13 | End: 2021-05-14

## 2021-05-13 RX ORDER — ICOSAPENT ETHYL 500 MG/1
0 CAPSULE, LIQUID FILLED ORAL
Qty: 0 | Refills: 1 | DISCHARGE

## 2021-05-13 RX ORDER — ACETAMINOPHEN 500 MG
650 TABLET ORAL EVERY 6 HOURS
Refills: 0 | Status: DISCONTINUED | OUTPATIENT
Start: 2021-05-13 | End: 2021-05-14

## 2021-05-13 RX ORDER — POTASSIUM CHLORIDE 20 MEQ
20 PACKET (EA) ORAL
Refills: 0 | Status: COMPLETED | OUTPATIENT
Start: 2021-05-13 | End: 2021-05-13

## 2021-05-13 RX ORDER — MAGNESIUM SULFATE 500 MG/ML
2 VIAL (ML) INJECTION ONCE
Refills: 0 | Status: COMPLETED | OUTPATIENT
Start: 2021-05-13 | End: 2021-05-13

## 2021-05-13 RX ORDER — INSULIN LISPRO 100/ML
VIAL (ML) SUBCUTANEOUS
Refills: 0 | Status: DISCONTINUED | OUTPATIENT
Start: 2021-05-13 | End: 2021-05-14

## 2021-05-13 RX ORDER — MORPHINE SULFATE 50 MG/1
4 CAPSULE, EXTENDED RELEASE ORAL EVERY 6 HOURS
Refills: 0 | Status: DISCONTINUED | OUTPATIENT
Start: 2021-05-13 | End: 2021-05-14

## 2021-05-13 RX ORDER — HYDROCHLOROTHIAZIDE 25 MG
50 TABLET ORAL DAILY
Refills: 0 | Status: DISCONTINUED | OUTPATIENT
Start: 2021-05-13 | End: 2021-05-14

## 2021-05-13 RX ORDER — FAMOTIDINE 10 MG/ML
0 INJECTION INTRAVENOUS
Qty: 0 | Refills: 0 | DISCHARGE

## 2021-05-13 RX ORDER — CHLORTHALIDONE 50 MG
0 TABLET ORAL
Qty: 0 | Refills: 0 | DISCHARGE

## 2021-05-13 RX ORDER — TAMSULOSIN HYDROCHLORIDE 0.4 MG/1
0 CAPSULE ORAL
Qty: 0 | Refills: 2 | DISCHARGE

## 2021-05-13 RX ORDER — METOPROLOL TARTRATE 50 MG
0 TABLET ORAL
Qty: 0 | Refills: 1 | DISCHARGE

## 2021-05-13 RX ADMIN — TAMSULOSIN HYDROCHLORIDE 0.8 MILLIGRAM(S): 0.4 CAPSULE ORAL at 21:15

## 2021-05-13 RX ADMIN — HEPARIN SODIUM 5000 UNIT(S): 5000 INJECTION INTRAVENOUS; SUBCUTANEOUS at 21:15

## 2021-05-13 RX ADMIN — SODIUM CHLORIDE 120 MILLILITER(S): 9 INJECTION, SOLUTION INTRAVENOUS at 02:59

## 2021-05-13 RX ADMIN — MORPHINE SULFATE 4 MILLIGRAM(S): 50 CAPSULE, EXTENDED RELEASE ORAL at 08:45

## 2021-05-13 RX ADMIN — MORPHINE SULFATE 4 MILLIGRAM(S): 50 CAPSULE, EXTENDED RELEASE ORAL at 17:30

## 2021-05-13 RX ADMIN — Medication 4: at 17:17

## 2021-05-13 RX ADMIN — Medication 650 MILLIGRAM(S): at 12:10

## 2021-05-13 RX ADMIN — Medication 650 MILLIGRAM(S): at 11:40

## 2021-05-13 RX ADMIN — Medication 50 MILLIEQUIVALENT(S): at 05:19

## 2021-05-13 RX ADMIN — MORPHINE SULFATE 4 MILLIGRAM(S): 50 CAPSULE, EXTENDED RELEASE ORAL at 08:16

## 2021-05-13 RX ADMIN — Medication 25 MILLIGRAM(S): at 02:59

## 2021-05-13 RX ADMIN — MORPHINE SULFATE 4 MILLIGRAM(S): 50 CAPSULE, EXTENDED RELEASE ORAL at 17:17

## 2021-05-13 RX ADMIN — Medication 100 MILLIGRAM(S): at 11:40

## 2021-05-13 RX ADMIN — Medication 50 MILLIGRAM(S): at 13:33

## 2021-05-13 RX ADMIN — PIPERACILLIN AND TAZOBACTAM 25 GRAM(S): 4; .5 INJECTION, POWDER, LYOPHILIZED, FOR SOLUTION INTRAVENOUS at 13:33

## 2021-05-13 RX ADMIN — PIPERACILLIN AND TAZOBACTAM 25 GRAM(S): 4; .5 INJECTION, POWDER, LYOPHILIZED, FOR SOLUTION INTRAVENOUS at 05:44

## 2021-05-13 RX ADMIN — FINASTERIDE 5 MILLIGRAM(S): 5 TABLET, FILM COATED ORAL at 11:40

## 2021-05-13 RX ADMIN — Medication 12: at 11:39

## 2021-05-13 RX ADMIN — LOSARTAN POTASSIUM 100 MILLIGRAM(S): 100 TABLET, FILM COATED ORAL at 05:18

## 2021-05-13 RX ADMIN — Medication 50 GRAM(S): at 04:13

## 2021-05-13 RX ADMIN — Medication 50 MILLIEQUIVALENT(S): at 08:16

## 2021-05-13 RX ADMIN — HEPARIN SODIUM 5000 UNIT(S): 5000 INJECTION INTRAVENOUS; SUBCUTANEOUS at 13:34

## 2021-05-13 RX ADMIN — PIPERACILLIN AND TAZOBACTAM 25 GRAM(S): 4; .5 INJECTION, POWDER, LYOPHILIZED, FOR SOLUTION INTRAVENOUS at 21:15

## 2021-05-13 RX ADMIN — ATORVASTATIN CALCIUM 80 MILLIGRAM(S): 80 TABLET, FILM COATED ORAL at 21:15

## 2021-05-13 RX ADMIN — HEPARIN SODIUM 5000 UNIT(S): 5000 INJECTION INTRAVENOUS; SUBCUTANEOUS at 05:19

## 2021-05-13 RX ADMIN — Medication 650 MILLIGRAM(S): at 19:20

## 2021-05-13 RX ADMIN — PANTOPRAZOLE SODIUM 40 MILLIGRAM(S): 20 TABLET, DELAYED RELEASE ORAL at 11:40

## 2021-05-13 RX ADMIN — Medication 50 MILLIEQUIVALENT(S): at 04:12

## 2021-05-13 NOTE — CONSULT NOTE ADULT - SUBJECTIVE AND OBJECTIVE BOX
HPI:  70 yo M with pmhx of HTN, DM, known cholelithiasis, s/p ventral hernia repair with mesh (> 10 years ago in [place]) presenting for abdominal pain. Patient states abdominal pain started yesterday after rosita salami. His pain is described as sharp, constant, located to his RUQ without radiation associated with nausea or vomiting. Denies similar symptoms in the past and denies alleviating factors. No cp, sob, fever, chills, diarrhea, back pain, urinary symptoms, headache, dizziness,  paresthesias, or weakness.   (12 May 2021 20:14)      PAST MEDICAL & SURGICAL HISTORY  Diabetes    HTN (hypertension)    H/O ventral hernia repair        FAMILY HISTORY:  FAMILY HISTORY:      SOCIAL HISTORY:  []smoker  []Alcohol  []Drug    ALLERGIES:  No Known Allergies      MEDICATIONS:  MEDICATIONS  (STANDING):  atorvastatin 80 milliGRAM(s) Oral at bedtime  dextrose 40% Gel 15 Gram(s) Oral once  dextrose 5%. 1000 milliLiter(s) (50 mL/Hr) IV Continuous <Continuous>  dextrose 5%. 1000 milliLiter(s) (100 mL/Hr) IV Continuous <Continuous>  dextrose 50% Injectable 25 Gram(s) IV Push once  dextrose 50% Injectable 12.5 Gram(s) IV Push once  dextrose 50% Injectable 25 Gram(s) IV Push once  finasteride 5 milliGRAM(s) Oral daily  glucagon  Injectable 1 milliGRAM(s) IntraMuscular once  heparin   Injectable 5000 Unit(s) SubCutaneous every 8 hours  hydrochlorothiazide 50 milliGRAM(s) Oral daily  insulin lispro (ADMELOG) corrective regimen sliding scale   SubCutaneous three times a day before meals  lactated ringers. 1000 milliLiter(s) (120 mL/Hr) IV Continuous <Continuous>  losartan 100 milliGRAM(s) Oral daily  metoprolol tartrate 100 milliGRAM(s) Oral daily  pantoprazole  Injectable 40 milliGRAM(s) IV Push daily  piperacillin/tazobactam IVPB.. 3.375 Gram(s) IV Intermittent every 8 hours  tamsulosin 0.8 milliGRAM(s) Oral at bedtime    MEDICATIONS  (PRN):  acetaminophen   Tablet .. 650 milliGRAM(s) Oral every 6 hours PRN Temp greater or equal to 38C (100.4F), Mild Pain (1 - 3)  morphine  - Injectable 4 milliGRAM(s) IV Push every 6 hours PRN Severe Pain (7 - 10)      HOME MEDICATIONS:  Home Medications:  CANDESARTAN 32MG TABLETS: TAKE 1 TABLET BY MOUTH DAILY (13 May 2021 08:19)  CHLORTHALIDONE 25 MG TABLET:  (13 May 2021 08:19)  CHLORTHALIDONE 25MG TABLETS: TAKE 1 TABLET BY MOUTH DAILY WITH BREAKFAST (13 May 2021 08:19)  DUTASTERIDE 0.5 MG CAPSULE:  (13 May 2021 08:19)  FARXIGA 10 MG TABLET:  (13 May 2021 08:19)  GLIPIZIDE 5 MG TABLET:  (13 May 2021 08:19)  ICOSAPENT ETHYL 1GM CAPSULES: TAKE 2 CAPSULES BY MOUTH TWICE DAILY WITH MEALS (13 May 2021 08:19)  METFORMIN 1000MG TABLETS: TAKE 1 TABLET BY MOUTH TWICE DAILY (13 May 2021 08:19)  METOPROLOL ER SUCCINATE 100MG TABS: TAKE 1 TABLET BY MOUTH DAILY (13 May 2021 08:19)  ROSUVASTATIN 20MG TABLETS: TAKE 1 TABLET BY MOUTH EVERY NIGHT (13 May 2021 08:19)  SILODOSIN 8 MG CAPSULE:  (13 May 2021 08:19)  TAMSULOSIN 0.4MG CAPSULES: TK 1 C PO D (13 May 2021 08:19)      VITALS:   T(F): 99.2 (05-13 @ 13:40), Max: 100.3 (05-13 @ 05:00)  HR: 113 (05-13 @ 12:07) (105 - 120)  BP: 116/58 (05-13 @ 12:07) (116/58 - 157/87)  BP(mean): --  RR: 18 (05-13 @ 12:07) (18 - 19)  SpO2: 93% (05-13 @ 01:00) (93% - 99%)    I&O's Summary    12 May 2021 07:01  -  13 May 2021 07:00  --------------------------------------------------------  IN: 840 mL / OUT: 0 mL / NET: 840 mL        REVIEW OF SYSTEMS:  CONSTITUTIONAL: No weakness, fevers or chills  EYES: No visual changes  ENT: No vertigo or throat pain   NECK: No pain or stiffness  RESPIRATORY: No cough, wheezing, hemoptysis; No shortness of breath  CARDIOVASCULAR: No chest pain or palpitations  GASTROINTESTINAL: (+) abdominal pain. No diarrhea or constipation. No melena or hematochezia.  GENITOURINARY: No dysuria, frequency or hematuria  NEUROLOGICAL: No numbness or weakness  SKIN: No itching, no rashes  MSK: No pain    PHYSICAL EXAM:  NEURO: patient is awake , alert and oriented  GEN: Not in acute distress  NECK: no thyroid enlargement, no JVD  LUNGS: Clear to auscultation bilaterally   CARDIOVASCULAR: S1/S2 present, RRR , no murmurs or rubs, no carotid bruits,  + PP bilaterally  ABD: Soft, non-tender, non-distended, +BS  EXT: No ANITHA  SKIN: Intact    LABS:                        15.3   14.76 )-----------( 164      ( 13 May 2021 00:30 )             45.5     05-13    133<L>  |  96<L>  |  16  ----------------------------<  248<H>  3.4<L>   |  24  |  1.2    Ca    9.0      13 May 2021 00:30  Phos  3.1     05-13  Mg     1.6     05-13    TPro  x   /  Alb  x   /  TBili  x   /  DBili  0.2  /  AST  x   /  ALT  x   /  AlkPhos  x   05-12    PT/INR - ( 12 May 2021 20:02 )   PT: 13.80 sec;   INR: 1.20 ratio         PTT - ( 12 May 2021 20:02 )  PTT:28.5 sec  Lactate, Blood: 1.5 mmol/L (05-13-21 @ 00:30)    CARDIAC MARKERS ( 12 May 2021 12:08 )  x     / <0.01 ng/mL / x     / x     / x            RADIOLOGY:  -CXR: no acute pathology    -TTE: < from: TTE Echo Complete w/o Contrast w/ Doppler (05.13.21 @ 09:03) >    Summary:   1. Left ventricular ejection fraction, by visual estimation, is 55 to 60%.   2. Mildly increased LV wall thickness.   3. Spectral Doppler shows impaired relaxation pattern of left ventricular myocardial filling (Grade I diastolic dysfunction).   4. No evidence of mitral valve regurgitation.    < end of copied text >    -CCTA:    -STRESS TEST:    -CATHETERIZATION:    ECG:  sinus tach to 103bpm    TELEMETRY EVENTS:   HPI:  72 yo M with pmhx of HTN, DM, known cholelithiasis, s/p ventral hernia repair with mesh (> 10 years ago in [place]) presenting for abdominal pain. Patient states abdominal pain started yesterday after rosita salami. His pain is described as sharp, constant, located to his RUQ without radiation associated with nausea or vomiting. Denies similar symptoms in the past and denies alleviating factors. No cp, sob, fever, chills, diarrhea, back pain, urinary symptoms, headache, dizziness,  paresthesias, or weakness.   (12 May 2021 20:14)        PAST MEDICAL & SURGICAL HISTORY  Diabetes    HTN (hypertension)    H/O ventral hernia repair        FAMILY HISTORY:  FAMILY HISTORY: NC      SOCIAL HISTORY:  [-]smoker  []Alcohol  []Drug    ALLERGIES:  No Known Allergies      MEDICATIONS:  MEDICATIONS  (STANDING):  atorvastatin 80 milliGRAM(s) Oral at bedtime  dextrose 40% Gel 15 Gram(s) Oral once  dextrose 5%. 1000 milliLiter(s) (50 mL/Hr) IV Continuous <Continuous>  dextrose 5%. 1000 milliLiter(s) (100 mL/Hr) IV Continuous <Continuous>  dextrose 50% Injectable 25 Gram(s) IV Push once  dextrose 50% Injectable 12.5 Gram(s) IV Push once  dextrose 50% Injectable 25 Gram(s) IV Push once  finasteride 5 milliGRAM(s) Oral daily  glucagon  Injectable 1 milliGRAM(s) IntraMuscular once  heparin   Injectable 5000 Unit(s) SubCutaneous every 8 hours  hydrochlorothiazide 50 milliGRAM(s) Oral daily  insulin lispro (ADMELOG) corrective regimen sliding scale   SubCutaneous three times a day before meals  lactated ringers. 1000 milliLiter(s) (120 mL/Hr) IV Continuous <Continuous>  losartan 100 milliGRAM(s) Oral daily  metoprolol tartrate 100 milliGRAM(s) Oral daily  pantoprazole  Injectable 40 milliGRAM(s) IV Push daily  piperacillin/tazobactam IVPB.. 3.375 Gram(s) IV Intermittent every 8 hours  tamsulosin 0.8 milliGRAM(s) Oral at bedtime    MEDICATIONS  (PRN):  acetaminophen   Tablet .. 650 milliGRAM(s) Oral every 6 hours PRN Temp greater or equal to 38C (100.4F), Mild Pain (1 - 3)  morphine  - Injectable 4 milliGRAM(s) IV Push every 6 hours PRN Severe Pain (7 - 10)      HOME MEDICATIONS:  Home Medications:  CANDESARTAN 32MG TABLETS: TAKE 1 TABLET BY MOUTH DAILY (13 May 2021 08:19)  CHLORTHALIDONE 25 MG TABLET:  (13 May 2021 08:19)  CHLORTHALIDONE 25MG TABLETS: TAKE 1 TABLET BY MOUTH DAILY WITH BREAKFAST (13 May 2021 08:19)  DUTASTERIDE 0.5 MG CAPSULE:  (13 May 2021 08:19)  FARXIGA 10 MG TABLET:  (13 May 2021 08:19)  GLIPIZIDE 5 MG TABLET:  (13 May 2021 08:19)  ICOSAPENT ETHYL 1GM CAPSULES: TAKE 2 CAPSULES BY MOUTH TWICE DAILY WITH MEALS (13 May 2021 08:19)  METFORMIN 1000MG TABLETS: TAKE 1 TABLET BY MOUTH TWICE DAILY (13 May 2021 08:19)  METOPROLOL ER SUCCINATE 100MG TABS: TAKE 1 TABLET BY MOUTH DAILY (13 May 2021 08:19)  ROSUVASTATIN 20MG TABLETS: TAKE 1 TABLET BY MOUTH EVERY NIGHT (13 May 2021 08:19)  SILODOSIN 8 MG CAPSULE:  (13 May 2021 08:19)  TAMSULOSIN 0.4MG CAPSULES: TK 1 C PO D (13 May 2021 08:19)      VITALS:   T(F): 99.2 (05-13 @ 13:40), Max: 100.3 (05-13 @ 05:00)  HR: 113 (05-13 @ 12:07) (105 - 120)  BP: 116/58 (05-13 @ 12:07) (116/58 - 157/87)  BP(mean): --  RR: 18 (05-13 @ 12:07) (18 - 19)  SpO2: 93% (05-13 @ 01:00) (93% - 99%)    I&O's Summary    12 May 2021 07:01  -  13 May 2021 07:00  --------------------------------------------------------  IN: 840 mL / OUT: 0 mL / NET: 840 mL        REVIEW OF SYSTEMS:  CONSTITUTIONAL: No weakness, fevers or chills  EYES: No visual changes  ENT: No vertigo or throat pain   NECK: No pain or stiffness  RESPIRATORY: No cough, wheezing, hemoptysis; No shortness of breath  CARDIOVASCULAR: No chest pain or palpitations  GASTROINTESTINAL: (+) abdominal pain. No diarrhea or constipation. No melena or hematochezia.  GENITOURINARY: No dysuria, frequency or hematuria  NEUROLOGICAL: No numbness or weakness  SKIN: No itching, no rashes  MSK: No pain    PHYSICAL EXAM:  NEURO: patient is awake , alert and oriented  GEN: Not in acute distress  NECK: no thyroid enlargement, no JVD  LUNGS: Clear to auscultation bilaterally   CARDIOVASCULAR: S1/S2 present, RRR , no murmurs or rubs, no carotid bruits,  + PP bilaterally  ABD: Soft, non-tender, non-distended, +BS  EXT: No ANITHA  SKIN: Intact    LABS:                        15.3   14.76 )-----------( 164      ( 13 May 2021 00:30 )             45.5     05-13    133<L>  |  96<L>  |  16  ----------------------------<  248<H>  3.4<L>   |  24  |  1.2    Ca    9.0      13 May 2021 00:30  Phos  3.1     05-13  Mg     1.6     05-13    TPro  x   /  Alb  x   /  TBili  x   /  DBili  0.2  /  AST  x   /  ALT  x   /  AlkPhos  x   05-12    PT/INR - ( 12 May 2021 20:02 )   PT: 13.80 sec;   INR: 1.20 ratio         PTT - ( 12 May 2021 20:02 )  PTT:28.5 sec  Lactate, Blood: 1.5 mmol/L (05-13-21 @ 00:30)    CARDIAC MARKERS ( 12 May 2021 12:08 )  x     / <0.01 ng/mL / x     / x     / x            RADIOLOGY:  -CXR: no acute pathology    -TTE: < from: TTE Echo Complete w/o Contrast w/ Doppler (05.13.21 @ 09:03) >    Summary:   1. Left ventricular ejection fraction, by visual estimation, is 55 to 60%.   2. Mildly increased LV wall thickness.   3. Spectral Doppler shows impaired relaxation pattern of left ventricular myocardial filling (Grade I diastolic dysfunction).   4. No evidence of mitral valve regurgitation.    < end of copied text >    -CCTA:    -STRESS TEST:    -CATHETERIZATION:    ECG:  sinus tach to 103bpm    TELEMETRY EVENTS:

## 2021-05-14 ENCOUNTER — TRANSCRIPTION ENCOUNTER (OUTPATIENT)
Age: 71
End: 2021-05-14

## 2021-05-14 ENCOUNTER — RESULT REVIEW (OUTPATIENT)
Age: 71
End: 2021-05-14

## 2021-05-14 LAB
ANION GAP SERPL CALC-SCNC: 15 MMOL/L — HIGH (ref 7–14)
BASOPHILS # BLD AUTO: 0 K/UL — SIGNIFICANT CHANGE UP (ref 0–0.2)
BASOPHILS NFR BLD AUTO: 0 % — SIGNIFICANT CHANGE UP (ref 0–1)
BUN SERPL-MCNC: 35 MG/DL — HIGH (ref 10–20)
CALCIUM SERPL-MCNC: 8.5 MG/DL — SIGNIFICANT CHANGE UP (ref 8.5–10.1)
CHLORIDE SERPL-SCNC: 101 MMOL/L — SIGNIFICANT CHANGE UP (ref 98–110)
CO2 SERPL-SCNC: 22 MMOL/L — SIGNIFICANT CHANGE UP (ref 17–32)
CREAT SERPL-MCNC: 1.7 MG/DL — HIGH (ref 0.7–1.5)
EOSINOPHIL # BLD AUTO: 0 K/UL — SIGNIFICANT CHANGE UP (ref 0–0.7)
EOSINOPHIL NFR BLD AUTO: 0 % — SIGNIFICANT CHANGE UP (ref 0–8)
GLUCOSE BLDC GLUCOMTR-MCNC: 233 MG/DL — HIGH (ref 70–99)
GLUCOSE BLDC GLUCOMTR-MCNC: 262 MG/DL — HIGH (ref 70–99)
GLUCOSE BLDC GLUCOMTR-MCNC: 268 MG/DL — HIGH (ref 70–99)
GLUCOSE BLDC GLUCOMTR-MCNC: 318 MG/DL — HIGH (ref 70–99)
GLUCOSE SERPL-MCNC: 275 MG/DL — HIGH (ref 70–99)
HCT VFR BLD CALC: 37.4 % — LOW (ref 42–52)
HGB BLD-MCNC: 12.6 G/DL — LOW (ref 14–18)
IMM GRANULOCYTES NFR BLD AUTO: 1.2 % — HIGH (ref 0.1–0.3)
LYMPHOCYTES # BLD AUTO: 0.8 K/UL — LOW (ref 1.2–3.4)
LYMPHOCYTES # BLD AUTO: 6.8 % — LOW (ref 20.5–51.1)
MAGNESIUM SERPL-MCNC: 2.2 MG/DL — SIGNIFICANT CHANGE UP (ref 1.8–2.4)
MCHC RBC-ENTMCNC: 28.4 PG — SIGNIFICANT CHANGE UP (ref 27–31)
MCHC RBC-ENTMCNC: 33.7 G/DL — SIGNIFICANT CHANGE UP (ref 32–37)
MCV RBC AUTO: 84.2 FL — SIGNIFICANT CHANGE UP (ref 80–94)
MONOCYTES # BLD AUTO: 0.77 K/UL — HIGH (ref 0.1–0.6)
MONOCYTES NFR BLD AUTO: 6.5 % — SIGNIFICANT CHANGE UP (ref 1.7–9.3)
NEUTROPHILS # BLD AUTO: 10.14 K/UL — HIGH (ref 1.4–6.5)
NEUTROPHILS NFR BLD AUTO: 85.5 % — HIGH (ref 42.2–75.2)
NRBC # BLD: 0 /100 WBCS — SIGNIFICANT CHANGE UP (ref 0–0)
PHOSPHATE SERPL-MCNC: 2.8 MG/DL — SIGNIFICANT CHANGE UP (ref 2.1–4.9)
PLATELET # BLD AUTO: 139 K/UL — SIGNIFICANT CHANGE UP (ref 130–400)
POTASSIUM SERPL-MCNC: 3.5 MMOL/L — SIGNIFICANT CHANGE UP (ref 3.5–5)
POTASSIUM SERPL-SCNC: 3.5 MMOL/L — SIGNIFICANT CHANGE UP (ref 3.5–5)
RBC # BLD: 4.44 M/UL — LOW (ref 4.7–6.1)
RBC # FLD: 13.3 % — SIGNIFICANT CHANGE UP (ref 11.5–14.5)
SODIUM SERPL-SCNC: 138 MMOL/L — SIGNIFICANT CHANGE UP (ref 135–146)
WBC # BLD: 11.85 K/UL — HIGH (ref 4.8–10.8)
WBC # FLD AUTO: 11.85 K/UL — HIGH (ref 4.8–10.8)

## 2021-05-14 PROCEDURE — 99233 SBSQ HOSP IP/OBS HIGH 50: CPT

## 2021-05-14 PROCEDURE — 47562 LAPAROSCOPIC CHOLECYSTECTOMY: CPT

## 2021-05-14 PROCEDURE — 88304 TISSUE EXAM BY PATHOLOGIST: CPT | Mod: 26

## 2021-05-14 RX ORDER — ONDANSETRON 8 MG/1
4 TABLET, FILM COATED ORAL ONCE
Refills: 0 | Status: DISCONTINUED | OUTPATIENT
Start: 2021-05-14 | End: 2021-05-14

## 2021-05-14 RX ORDER — SODIUM CHLORIDE 9 MG/ML
500 INJECTION INTRAMUSCULAR; INTRAVENOUS; SUBCUTANEOUS ONCE
Refills: 0 | Status: COMPLETED | OUTPATIENT
Start: 2021-05-14 | End: 2021-05-14

## 2021-05-14 RX ORDER — INSULIN LISPRO 100/ML
VIAL (ML) SUBCUTANEOUS
Refills: 0 | Status: DISCONTINUED | OUTPATIENT
Start: 2021-05-14 | End: 2021-05-15

## 2021-05-14 RX ORDER — INSULIN LISPRO 100/ML
7 VIAL (ML) SUBCUTANEOUS ONCE
Refills: 0 | Status: COMPLETED | OUTPATIENT
Start: 2021-05-14 | End: 2021-05-14

## 2021-05-14 RX ORDER — METOPROLOL TARTRATE 50 MG
100 TABLET ORAL DAILY
Refills: 0 | Status: DISCONTINUED | OUTPATIENT
Start: 2021-05-14 | End: 2021-05-15

## 2021-05-14 RX ORDER — LOSARTAN POTASSIUM 100 MG/1
100 TABLET, FILM COATED ORAL DAILY
Refills: 0 | Status: DISCONTINUED | OUTPATIENT
Start: 2021-05-14 | End: 2021-05-15

## 2021-05-14 RX ORDER — HYDROMORPHONE HYDROCHLORIDE 2 MG/ML
1 INJECTION INTRAMUSCULAR; INTRAVENOUS; SUBCUTANEOUS
Refills: 0 | Status: DISCONTINUED | OUTPATIENT
Start: 2021-05-14 | End: 2021-05-14

## 2021-05-14 RX ORDER — HEPARIN SODIUM 5000 [USP'U]/ML
5000 INJECTION INTRAVENOUS; SUBCUTANEOUS EVERY 8 HOURS
Refills: 0 | Status: DISCONTINUED | OUTPATIENT
Start: 2021-05-14 | End: 2021-05-15

## 2021-05-14 RX ORDER — ACETAMINOPHEN 500 MG
650 TABLET ORAL EVERY 6 HOURS
Refills: 0 | Status: DISCONTINUED | OUTPATIENT
Start: 2021-05-14 | End: 2021-05-15

## 2021-05-14 RX ORDER — ATORVASTATIN CALCIUM 80 MG/1
80 TABLET, FILM COATED ORAL AT BEDTIME
Refills: 0 | Status: DISCONTINUED | OUTPATIENT
Start: 2021-05-14 | End: 2021-05-15

## 2021-05-14 RX ORDER — FINASTERIDE 5 MG/1
5 TABLET, FILM COATED ORAL DAILY
Refills: 0 | Status: DISCONTINUED | OUTPATIENT
Start: 2021-05-14 | End: 2021-05-15

## 2021-05-14 RX ORDER — OXYCODONE HYDROCHLORIDE 5 MG/1
1 TABLET ORAL
Qty: 10 | Refills: 0
Start: 2021-05-14

## 2021-05-14 RX ORDER — POTASSIUM CHLORIDE 20 MEQ
20 PACKET (EA) ORAL
Refills: 0 | Status: COMPLETED | OUTPATIENT
Start: 2021-05-14 | End: 2021-05-14

## 2021-05-14 RX ORDER — METOPROLOL TARTRATE 50 MG
100 TABLET ORAL DAILY
Refills: 0 | Status: DISCONTINUED | OUTPATIENT
Start: 2021-05-14 | End: 2021-05-14

## 2021-05-14 RX ORDER — HYDROMORPHONE HYDROCHLORIDE 2 MG/ML
0.5 INJECTION INTRAMUSCULAR; INTRAVENOUS; SUBCUTANEOUS
Refills: 0 | Status: DISCONTINUED | OUTPATIENT
Start: 2021-05-14 | End: 2021-05-14

## 2021-05-14 RX ORDER — HYDROCHLOROTHIAZIDE 25 MG
50 TABLET ORAL DAILY
Refills: 0 | Status: DISCONTINUED | OUTPATIENT
Start: 2021-05-14 | End: 2021-05-15

## 2021-05-14 RX ORDER — INSULIN LISPRO 100/ML
4 VIAL (ML) SUBCUTANEOUS ONCE
Refills: 0 | Status: COMPLETED | OUTPATIENT
Start: 2021-05-14 | End: 2021-05-14

## 2021-05-14 RX ORDER — TAMSULOSIN HYDROCHLORIDE 0.4 MG/1
0.8 CAPSULE ORAL AT BEDTIME
Refills: 0 | Status: DISCONTINUED | OUTPATIENT
Start: 2021-05-14 | End: 2021-05-15

## 2021-05-14 RX ORDER — SODIUM CHLORIDE 9 MG/ML
1000 INJECTION, SOLUTION INTRAVENOUS
Refills: 0 | Status: DISCONTINUED | OUTPATIENT
Start: 2021-05-14 | End: 2021-05-14

## 2021-05-14 RX ADMIN — Medication 50 MILLIEQUIVALENT(S): at 05:09

## 2021-05-14 RX ADMIN — PIPERACILLIN AND TAZOBACTAM 25 GRAM(S): 4; .5 INJECTION, POWDER, LYOPHILIZED, FOR SOLUTION INTRAVENOUS at 05:09

## 2021-05-14 RX ADMIN — FINASTERIDE 5 MILLIGRAM(S): 5 TABLET, FILM COATED ORAL at 13:23

## 2021-05-14 RX ADMIN — HEPARIN SODIUM 5000 UNIT(S): 5000 INJECTION INTRAVENOUS; SUBCUTANEOUS at 05:10

## 2021-05-14 RX ADMIN — Medication 100 MILLIGRAM(S): at 18:33

## 2021-05-14 RX ADMIN — Medication 50 MILLIGRAM(S): at 18:33

## 2021-05-14 RX ADMIN — LOSARTAN POTASSIUM 100 MILLIGRAM(S): 100 TABLET, FILM COATED ORAL at 18:33

## 2021-05-14 RX ADMIN — MORPHINE SULFATE 4 MILLIGRAM(S): 50 CAPSULE, EXTENDED RELEASE ORAL at 04:22

## 2021-05-14 RX ADMIN — Medication 4 UNIT(S): at 03:44

## 2021-05-14 RX ADMIN — SODIUM CHLORIDE 1000 MILLILITER(S): 9 INJECTION INTRAMUSCULAR; INTRAVENOUS; SUBCUTANEOUS at 03:43

## 2021-05-14 RX ADMIN — HEPARIN SODIUM 5000 UNIT(S): 5000 INJECTION INTRAVENOUS; SUBCUTANEOUS at 13:24

## 2021-05-14 RX ADMIN — ATORVASTATIN CALCIUM 80 MILLIGRAM(S): 80 TABLET, FILM COATED ORAL at 21:15

## 2021-05-14 RX ADMIN — Medication 100 MILLIGRAM(S): at 05:09

## 2021-05-14 RX ADMIN — Medication 7 UNIT(S): at 23:09

## 2021-05-14 RX ADMIN — MORPHINE SULFATE 4 MILLIGRAM(S): 50 CAPSULE, EXTENDED RELEASE ORAL at 03:52

## 2021-05-14 RX ADMIN — TAMSULOSIN HYDROCHLORIDE 0.8 MILLIGRAM(S): 0.4 CAPSULE ORAL at 21:15

## 2021-05-14 RX ADMIN — Medication 12: at 16:17

## 2021-05-14 RX ADMIN — LOSARTAN POTASSIUM 100 MILLIGRAM(S): 100 TABLET, FILM COATED ORAL at 05:09

## 2021-05-14 RX ADMIN — Medication 50 MILLIGRAM(S): at 05:10

## 2021-05-14 RX ADMIN — HEPARIN SODIUM 5000 UNIT(S): 5000 INJECTION INTRAVENOUS; SUBCUTANEOUS at 21:15

## 2021-05-14 NOTE — CONSULT NOTE ADULT - SUBJECTIVE AND OBJECTIVE BOX
CC: abdominal pain    Hx:  72 yo M pt p/w RUQ, sharp, non-radiating, abdominal pain assoc w/ n/v, found to have cholecystitis pending lap keith. Pt continues to report RUQ abdominal discomfort, controlled (mild) w/ analgesics and mild nausea. Denies other concerns/complaints.    ROS:  Constitutional: no fevers; no chills  Eyes: no conjunctivitis; no itching  ENT: no dysphagia; no odynophagia  CVS: no PND; no orthopnea; no chest pain  Resp: no SOB; no coughing  GI: +nausea; no vomiting; no diarrhea; +abd pain  : no dysuria; no hematuria  MSK: no myalgias; no arthralgias  Skin: no rashes; no ulcers  Neuro: no focal weakness; no headache  All other systems reviewed and are negative    Medications (in-pt):  atorvastatin 80 milliGRAM(s) Oral at bedtime  dextrose 40% Gel 15 Gram(s) Oral once  dextrose 5%. 1000 milliLiter(s) (50 mL/Hr) IV Continuous <Continuous>  dextrose 5%. 1000 milliLiter(s) (100 mL/Hr) IV Continuous <Continuous>  dextrose 50% Injectable 25 Gram(s) IV Push once  dextrose 50% Injectable 12.5 Gram(s) IV Push once  dextrose 50% Injectable 25 Gram(s) IV Push once  finasteride 5 milliGRAM(s) Oral daily  glucagon  Injectable 1 milliGRAM(s) IntraMuscular once  heparin   Injectable 5000 Unit(s) SubCutaneous every 8 hours  hydrochlorothiazide 50 milliGRAM(s) Oral daily  insulin lispro (ADMELOG) corrective regimen sliding scale   SubCutaneous three times a day before meals  lactated ringers. 1000 milliLiter(s) (120 mL/Hr) IV Continuous <Continuous>  losartan 100 milliGRAM(s) Oral daily  metoprolol tartrate 100 milliGRAM(s) Oral daily  pantoprazole  Injectable 40 milliGRAM(s) IV Push daily  piperacillin/tazobactam IVPB.. 3.375 Gram(s) IV Intermittent every 8 hours  tamsulosin 0.8 milliGRAM(s) Oral at bedtime  acetaminophen   Tablet .. 650 milliGRAM(s) Oral every 6 hours PRN Temp greater or equal to 38C (100.4F), Mild Pain (1 - 3)  morphine  - Injectable 4 milliGRAM(s) IV Push every 6 hours PRN Severe Pain (7 - 10)    Exam:  Vitals: BP = 138/88; P = 104; T = 96.9; RR = 19; SpO2 > 95  General: appears stated age; cooperative  Eyes: anicteric sclera; moist conjunctiva; PERRL; EOMI  HENT: NC/AT; clear oropharynx w/ moist mucous membranes; nL hard/soft palate  Neck: supple w/ FROM; trachea midline; no thyromegaly; no carotid bruits  Lungs: cta b/l with no tachypnea, accessory muscle usage, wheezing, rhonci or rales  CVS: RRR; S1 and S2 w/o MRGs  Abd: BS+; soft; non-tender to palpation except for the RUQ (mildly tender); no masses or HSM  Ext: no peripheral edema; pulses palpable distally  Skin: normal temp, turgor and texture; no rashes, ulcers or nodules  Neuro: CN II-XII intact; able to move all extremities  Psych: appropriate affect; alert and oriented to person, place, time and situation    Labs reviewed:  WBC 13.99  AG 15  Bicarb 23  BUN/Cr 23/1.4      Imaging reviewed:   Findings consistent with acute cholecystitis.   Distended gallbladder with surrounding inflammation.   Cholelithiasis and stones within the cystic duct.    EKG reviewed: Sinus tachycardia    TTE reviewed: LVEF of 55-60% w/ grade I dd

## 2021-05-14 NOTE — PROVIDER CONTACT NOTE (OTHER) - ACTION/TREATMENT ORDERED:
MD made aware and said will put insulin order
MD states he will put in insulin orders
EKG ordered and dose of metoprolol given; Md assessed pt at the bedside.

## 2021-05-14 NOTE — BRIEF OPERATIVE NOTE - NSICDXBRIEFPROCEDURE_GEN_ALL_CORE_FT
PROCEDURES:  Laparoscopic cholecystectomy using multiple ports 14-May-2021 10:31:05  Harish Mendez

## 2021-05-14 NOTE — DISCHARGE NOTE PROVIDER - HOSPITAL COURSE
70 y/o M with pmhx of HTN and DM presented to the ED complaining of abdominal pain localized in RUQ for 2 days. CTAP showed inflammation surrounding the gallbladder with gallbladder distension and cholelithiasis present. Pt was made NPO, started on Pre-op Zosyn, given IVF and pain controlled. Pt was cleared by cardiology as well as hospitalist and deemed moderate risk for operative procedure. Pt had pre-op echo which showed EF 60%.   Pt underwent laparoscopic cholecystectomy and tolerated the procedure well.   Pt tolerated regular diet, urinated, ambulated post-op.     Pt to follow up in office in 2 weeks.

## 2021-05-14 NOTE — DISCHARGE NOTE PROVIDER - NSDCCPCAREPLAN_GEN_ALL_CORE_FT
PRINCIPAL DISCHARGE DIAGNOSIS  Diagnosis: Cholecystitis  Assessment and Plan of Treatment: You came the the Emergency department complaining of abdominal pain. CT scan of your abdomen showed gallbladder inflammation as well as gallstones. You were diagnosed with a condition called acute cholecystitis.   You were started on IV antibiotics.   You were evaluated by medicine and cardiology teams to determine your operative risk.   You underwent an echocardiogram and duplex scan of legs pre operatively.   You underwent a laparoscopic cholecystectomy (removal of gallbladder). You tolerated the procedure well.   You may eat a regular diet (Low fat). Avoid heavy lifting (over 10lbs) for the next 6 weeks.   You may leave dressings in place for the next 48 hours. You may get dressings wet. Steri strips will fall off on their own.   If you are in pain:   You may take Tylenol/Motrin as needed every 4-6 hours for pain control.   Oxycodone will be sent to your pharmacy for severe pain.   Oral antibiotic called Augmentin will be sent to your pharmacy.  If you experience severe pain, fever, chest pain, shortness of breath, nausea, vomiting, abdominal distension- please come to nearest Emergency Department.   Vanessawo up with Dr. Chen in office in 2 weeks. Call office to make appointment.       PRINCIPAL DISCHARGE DIAGNOSIS  Diagnosis: Cholecystitis  Assessment and Plan of Treatment: You came the the Emergency department complaining of abdominal pain. CT scan of your abdomen showed gallbladder inflammation as well as gallstones. You were diagnosed with a condition called acute cholecystitis.   You were started on IV antibiotics.   You were evaluated by medicine and cardiology teams to determine your operative risk.   You underwent an echocardiogram and duplex scan of legs pre operatively.   You underwent a laparoscopic cholecystectomy (removal of gallbladder). You tolerated the procedure well.   You may eat a regular diet (Low fat). Avoid heavy lifting (over 10lbs) for the next 6 weeks.   You may leave dressings in place for the next 48 hours. You may get dressings wet. Steri strips will fall off on their own.   If you are in pain:   You may take Tylenol/Motrin as needed every 4-6 hours for pain control.   Oxycodone will be sent to your pharmacy for severe pain.   No antibiotics needed  If you experience severe pain, fever, chest pain, shortness of breath, nausea, vomiting, abdominal distension- please come to nearest Emergency Department.   Follwo up with Dr. Chen in office in 2 weeks. Call office to make appointment.

## 2021-05-14 NOTE — DISCHARGE NOTE PROVIDER - NSDCMRMEDTOKEN_GEN_ALL_CORE_FT
Augmentin 875 mg-125 mg oral tablet: 1 tab(s) orally 2 times a day   CANDESARTAN 32MG TABLETS: TAKE 1 TABLET BY MOUTH DAILY  CHLORTHALIDONE 25 MG TABLET:   CHLORTHALIDONE 25MG TABLETS: TAKE 1 TABLET BY MOUTH DAILY WITH BREAKFAST  DUTASTERIDE 0.5 MG CAPSULE:   FARXIGA 10 MG TABLET:   GLIPIZIDE 5 MG TABLET:   ICOSAPENT ETHYL 1GM CAPSULES: TAKE 2 CAPSULES BY MOUTH TWICE DAILY WITH MEALS  METFORMIN 1000MG TABLETS: TAKE 1 TABLET BY MOUTH TWICE DAILY  METOPROLOL ER SUCCINATE 100MG TABS: TAKE 1 TABLET BY MOUTH DAILY  oxyCODONE 5 mg oral capsule: 1 cap(s) orally every 6 hours MDD:4  ROSUVASTATIN 20MG TABLETS: TAKE 1 TABLET BY MOUTH EVERY NIGHT  SILODOSIN 8 MG CAPSULE:   TAMSULOSIN 0.4MG CAPSULES: TK 1 C PO D   CANDESARTAN 32MG TABLETS: TAKE 1 TABLET BY MOUTH DAILY  CHLORTHALIDONE 25 MG TABLET:   CHLORTHALIDONE 25MG TABLETS: TAKE 1 TABLET BY MOUTH DAILY WITH BREAKFAST  DUTASTERIDE 0.5 MG CAPSULE:   FARXIGA 10 MG TABLET:   GLIPIZIDE 5 MG TABLET:   ICOSAPENT ETHYL 1GM CAPSULES: TAKE 2 CAPSULES BY MOUTH TWICE DAILY WITH MEALS  METFORMIN 1000MG TABLETS: TAKE 1 TABLET BY MOUTH TWICE DAILY  METOPROLOL ER SUCCINATE 100MG TABS: TAKE 1 TABLET BY MOUTH DAILY  oxyCODONE 5 mg oral capsule: 1 cap(s) orally every 6 hours MDD:4  ROSUVASTATIN 20MG TABLETS: TAKE 1 TABLET BY MOUTH EVERY NIGHT  SILODOSIN 8 MG CAPSULE:   TAMSULOSIN 0.4MG CAPSULES: TK 1 C PO D   Augmentin 875 mg-125 mg oral tablet: 1 tab(s) orally 2 times a day   CANDESARTAN 32MG TABLETS: TAKE 1 TABLET BY MOUTH DAILY  CHLORTHALIDONE 25MG TABLETS: TAKE 1 TABLET BY MOUTH DAILY WITH BREAKFAST  DUTASTERIDE 0.5 MG CAPSULE:   FARXIGA 10 MG TABLET:   GLIPIZIDE 5 MG TABLET:   ICOSAPENT ETHYL 1GM CAPSULES: TAKE 2 CAPSULES BY MOUTH TWICE DAILY WITH MEALS  METFORMIN 1000MG TABLETS: TAKE 1 TABLET BY MOUTH TWICE DAILY  METOPROLOL ER SUCCINATE 100MG TABS: TAKE 1 TABLET BY MOUTH DAILY  oxyCODONE 5 mg oral capsule: 1 cap(s) orally every 6 hours MDD:4  ROSUVASTATIN 20MG TABLETS: TAKE 1 TABLET BY MOUTH EVERY NIGHT  SILODOSIN 8 MG CAPSULE:   TAMSULOSIN 0.4MG CAPSULES: TK 1 C PO D

## 2021-05-14 NOTE — DISCHARGE NOTE PROVIDER - CARE PROVIDER_API CALL
Junior Chen)  Surgery; Surgical Critical Care  [address] [place] [address], [address] [place] [address]  Phone: [phone]  Fax: [phone]  Follow Up Time:

## 2021-05-14 NOTE — CONSULT NOTE ADULT - ASSESSMENT
* Patient-based characteristics (Functional capacity)  Patient is able to achieve more than 4 MET (walk 4 blocks, climb 2 flights of stairs, etc...)          Y [X] / N []    High-risk patient features:  - Recent (<30 days) or active MI          Y [] / N [X]  - Unstable or severe angina          Y [] / N [X]  - Decompensated heart failure, or worsening or new-onset heart failure          Y [] / N [X]  - Severe valvular disease          Y [] / N [X]  - Significant arrhythmia (Tachy- or Bradyarrhythmia)          Y [] / N [X]    * Surgery/Procedure-based characteristics (Type of surgery)  - Low-risk procedure (outpatient procedure, elective, endoscopy, etc...)          Y [] / N []  - Elevated or Moderate-risk procedure (Inpatient)          Y [x] / N []  - High-risk procedure (urgent/emergent procedure, Intrathoracic, vascular, etc...)          Y [] / N []    * Revised Cardiac Risk Index (RCRI)  1- History of ischemic heart disease          Y [] / N [X]  2- History of congestive heart failure          Y [] / N [X]  3- History of stroke/TIA          Y [] / N [X]  4- History of insulin-dependent diabetes          Y [] / N [X]  5- Chronic kidney disease (Cr >2mg/dL)          Y [] / N [X]  6- Undergoing suprainguinal vascular, intraperitoneal, or intrathoracic surgery          Y [] / N [X]    Class I risk (Zero factors) --> 3.9% (30-day risk of death, MI, or cardiac arrest)    * IMPRESSION & RECOMMENDATIONS:  - No cardiac history, no CAD, no Chest pain, no CHF, no dyspnea, no arrhythmia, no significant valvular disease  - METS>4  - Low -risk patient for a Moderate -risk surgery/procedure  - No further cardiac work-up is needed at the moment. There are no current cardiac contraindications to prevent from proceeding with the scheduled surgery/procedure.    - This consult serves only as a lexy-operative cardiac risk stratification and evaluation to predict 30-days cardiac complications risk and mortality. The decision to proceed with the surgery/procedure is made by the performing physician and the patient -
70 yo M pt admitted w/ acute cholecystitis pending lap keith. Medicine evaluation requested for pre-op risk stratification.    Problem List:  (1) Acute cholecystitis   (2) DM w/ hyperglycemia  (3) HTN - chronic  (4) Dyslipidemia - chronic  (5) Possible CKD 2 or 3a possibly diabetic nephropathy    Risk stratification:  No signs/symptoms of decompensated CHF, CVA or ACS. RCRI class I risk (low risk for lexy-op MACE). Functional capacity at baseline > 4 METS. As such no additional cardiac testing is needed at this time. For DM, can control BG levels w/ short acting insulin in the lexy-op period (would aim for a random BG level < 180 mg/dL). The patient's BP is within acceptable limits. Can consider holding diuretic and ARB on the day of the intervention to reduce chance of lexy-op hypotension (would continue beta-blocker). Renal function is likely at baseline. No other acutely decompensated medical ailments other than the condition for which the intervention itself is being proposed. Overall, the patient is likely at a low risk for the proposed moderate risk procedure. With his BG level at the target range, he would be optimized for the intervention as planned.     Thank you for allowing us to participate in the care of this patient. Please call with any questions or concerns.

## 2021-05-14 NOTE — DISCHARGE NOTE PROVIDER - NSDCFUADDINST_GEN_ALL_CORE_FT
Follow up with Dr Chen in 2 weeks call office for appointment   follow up with your PMD    no strenuous activity  keep wound clean and dry  no tub bath  Resume home medications    if experience fever, shortness of breath, chest pain, dizziness, vomiting , bleeding or drainage from wound call MD or return to ED  Follow up with Dr Chen in 2 weeks call office for appointment   follow up with your PMD    no strenuous activity  keep wound clean and dry  no tub bath  Resume home medications

## 2021-05-15 ENCOUNTER — TRANSCRIPTION ENCOUNTER (OUTPATIENT)
Age: 71
End: 2021-05-15

## 2021-05-15 VITALS
RESPIRATION RATE: 18 BRPM | HEART RATE: 84 BPM | DIASTOLIC BLOOD PRESSURE: 63 MMHG | SYSTOLIC BLOOD PRESSURE: 126 MMHG | TEMPERATURE: 98 F

## 2021-05-15 LAB
GLUCOSE BLDC GLUCOMTR-MCNC: 194 MG/DL — HIGH (ref 70–99)
GLUCOSE BLDC GLUCOMTR-MCNC: 218 MG/DL — HIGH (ref 70–99)
GLUCOSE BLDC GLUCOMTR-MCNC: 257 MG/DL — HIGH (ref 70–99)
GLUCOSE BLDC GLUCOMTR-MCNC: 330 MG/DL — HIGH (ref 70–99)

## 2021-05-15 RX ORDER — CHLORTHALIDONE 50 MG
0 TABLET ORAL
Qty: 0 | Refills: 0 | DISCHARGE

## 2021-05-15 RX ADMIN — Medication 650 MILLIGRAM(S): at 13:13

## 2021-05-15 RX ADMIN — Medication 12: at 11:50

## 2021-05-15 RX ADMIN — Medication 50 MILLIGRAM(S): at 05:05

## 2021-05-15 RX ADMIN — HEPARIN SODIUM 5000 UNIT(S): 5000 INJECTION INTRAVENOUS; SUBCUTANEOUS at 05:06

## 2021-05-15 RX ADMIN — Medication 6: at 07:49

## 2021-05-15 RX ADMIN — FINASTERIDE 5 MILLIGRAM(S): 5 TABLET, FILM COATED ORAL at 11:53

## 2021-05-15 RX ADMIN — LOSARTAN POTASSIUM 100 MILLIGRAM(S): 100 TABLET, FILM COATED ORAL at 05:06

## 2021-05-15 RX ADMIN — Medication 100 MILLIGRAM(S): at 05:05

## 2021-05-15 NOTE — PROGRESS NOTE ADULT - ASSESSMENT
A/P    OR today for Lap Awilda  NPO  IVF  labs  repletions  vitals
71y Male with PMH of HTN, HLD, BPH, sxh of ventral hernia repair with mesh, presented to the ED with abdominal pain. RUQ tenderness. WBC 15, tbili 1.1 d bili 0.2, lactate 2.5, US showing gb qwall 4.7mm, CBD 6mm. CT showing cholelithiasis with surrounding inflammatory changes and a cystic duct stone.     PLAN:   NPO  IVF  IV abx - zosyn  Pre-op optimization  cardio clearance  Medical clearance  Echo  will book for lap keith 5/14
Assessment:  71y Male patient admitted POD 1 s/p laparoscopic cholecystectomy doing well, tolerating diet, no N/V , with the above physical exam, labs, and imaging findings.    Plan:  - discharge today  -Pain control as needed  -Hemodynamic monitoring as per routine  -Encourage ambulation and incentive spirometer use (10x/hr when awake)  -GI and DVT prophylaxis  -Check and replete CBC and BMP q daily  -Strict input and output monitoring  -Continue current management    Date/Time: 05-15-21 @ 08:04

## 2021-05-15 NOTE — DISCHARGE NOTE NURSING/CASE MANAGEMENT/SOCIAL WORK - PATIENT PORTAL LINK FT
You can access the FollowMyHealth Patient Portal offered by [facility] by registering at the following website: [email or web address]. By joining [facility]’s FollowMyHealth portal, you will also be able to view your health information using other applications (apps) compatible with our system.

## 2021-05-15 NOTE — CHART NOTE - NSCHARTNOTEFT_GEN_A_CORE
pt without complaints, tolerated po diet , voided and ambulated. vital signs stable and afebrile. pt doing well. As per Dr Chen pt can be discharged home today. pt advised to follow up with Dr Chen in 2 weeks. Resume home medications. precaution provided  All questions answered @ this time.

## 2021-05-15 NOTE — PROGRESS NOTE ADULT - ATTENDING COMMENTS
Trauma/ACS Attending Progress Note Attestation    Patient is examined and evaluated at the bedside with the residents/PAs. Treatment plan discussed with the team, nurses, and consulting physicians and consulting teams. Medications, radiological studies and all other relevant studies reviewed. I reviewed the resident/PA note and agreed with above assessment and plan with following additions and corrections.    NGOZI RANDHAWA Patient is a 71y old  Male who presents with a chief complaint of Acute cholecystitis (15 May 2021 08:04)      Vital Signs Last 24 Hrs  T(C): 36.6 (15 May 2021 05:00), Max: 38.3 (14 May 2021 17:10)  T(F): 97.8 (15 May 2021 05:00), Max: 100.9 (14 May 2021 17:10)  HR: 84 (15 May 2021 05:00) (84 - 121)  BP: 118/64 (15 May 2021 05:20) (100/59 - 132/66)  BP(mean): --  RR: 18 (15 May 2021 05:00) (13 - 21)  SpO2: 96% (14 May 2021 12:10) (91% - 98%)                        12.6   11.85 )-----------( 139      ( 14 May 2021 22:00 )             37.4     05-14    138  |  101  |  35<H>  ----------------------------<  275<H>  3.5   |  22  |  1.7<H>    Ca    8.5      14 May 2021 22:00  Phos  2.8     05-14  Mg     2.2     05-14    TPro  5.7<L>  /  Alb  3.7  /  TBili  1.7<H>  /  DBili  0.5<H>  /  AST  20  /  ALT  17  /  AlkPhos  57  05-13      Diagnosis: S/P Laparoscopic cholecystectomy    Plan:	  - supportive care  - GI/DVT prophylaxis  - pain management  - follow up consults  - repeat studies as needed  - D/C home planning

## 2021-05-15 NOTE — PROGRESS NOTE ADULT - SUBJECTIVE AND OBJECTIVE BOX
GENERAL SURGERY PROGRESS NOTE     NGOZI RANDHAWA  71y  Male  Hospital day :1d  POD:  Procedure:   OVERNIGHT EVENTS:    T(F): 98.7 (05-13-21 @ 09:31), Max: 100.3 (05-13-21 @ 05:00)  HR: 110 (05-13-21 @ 09:31) (105 - 120)  BP: 120/65 (05-13-21 @ 09:31) (120/65 - 157/87)  ABP: --  ABP(mean): --  RR: 18 (05-13-21 @ 09:31) (18 - 19)  SpO2: 93% (05-13-21 @ 01:00) (93% - 99%)    DIET/FLUIDS: dextrose 5%. 1000 milliLiter(s) IV Continuous <Continuous>  dextrose 5%. 1000 milliLiter(s) IV Continuous <Continuous>  lactated ringers. 1000 milliLiter(s) IV Continuous <Continuous>    NG:                                                                                DRAINS:     BM:     EMESIS:     URINE:      GI proph:  pantoprazole  Injectable 40 milliGRAM(s) IV Push daily    AC/ proph: heparin   Injectable 5000 Unit(s) SubCutaneous every 8 hours    ABx: piperacillin/tazobactam IVPB.. 3.375 Gram(s) IV Intermittent every 8 hours      PHYSICAL EXAM  General: NAD, AAOx3, calm and cooperative  HEENT: NCAT, FELICE, EOMI, Trachea ML, Neck supple  Cardiac: RRR S1, S2, no Murmurs, rubs or gallops  Respiratory: CTAB, normal respiratory effort  Abdomen: Soft, non-distended, RUQ tenderness, no rebound or guarding  Neuro: Sensation grossly intact and equal throughout  Vascular: Pulses 2+ throughout, extremities well perfused  Skin: Warm/dry, normal color, no jaundice    LABS  Labs:  CAPILLARY BLOOD GLUCOSE      POCT Blood Glucose.: 298 mg/dL (13 May 2021 06:02)                          15.3   14.76 )-----------( 164      ( 13 May 2021 00:30 )             45.5       Auto Immature Granulocyte %: 0.5 % (05-13-21 @ 00:30)  Auto Neutrophil %: 85.5 % (05-13-21 @ 00:30)  Auto Neutrophil %: 86.4 % (05-12-21 @ 12:08)  Auto Immature Granulocyte %: 0.5 % (05-12-21 @ 12:08)    05-13    133<L>  |  96<L>  |  16  ----------------------------<  248<H>  3.4<L>   |  24  |  1.2      Calcium, Total Serum: 9.0 mg/dL (05-13-21 @ 00:30)      LFTs:             x    | x    | x        ------------------[x       ( 12 May 2021 15:18 )  x    | 0.2  | x           Lipase:x      Amylase:x         Lactate, Blood: 1.5 mmol/L (05-13-21 @ 00:30)  Lactate, Blood: 2.5 mmol/L (05-12-21 @ 12:08)      Coags:     13.80  ----< 1.20    ( 12 May 2021 20:02 )     28.5        CARDIAC MARKERS ( 12 May 2021 12:08 )  x     / <0.01 ng/mL / x     / x     / x                      RADIOLOGY & ADDITIONAL TESTS:      A/P        
Progress Note: Surgery  Patient: NGOZI RANDHAWA , 71y (1950)Male   MRN: [id number]  Location: [address]  Visit: 05-12-21 Inpatient  Date: 05-15-21 @ 08:04    Events over 24h: No acute event overnight. No new complaint. Pt is hemodynamically stable. Overnight Tm of 100.9 however resolved on its own with tylenol. Patient stable for discharge today    Vitals: T(F): 97.8 (05-15-21 @ 05:00), Max: 100.9 (05-14-21 @ 17:10)  HR: 84 (05-15-21 @ 05:00)  BP: 118/64 (05-15-21 @ 05:20) (100/59 - 138/88)  RR: 18 (05-15-21 @ 05:00)  SpO2: 96% (05-14-21 @ 12:10)    Diet: Diet, Regular:   Low Fat (LOWFAT) (05-14-21 @ 10:38)    Physical Examination:  General Appearance: NAD, alert and cooperative  Heart: S1 and S2. No murmurs. Rhythm is regular.  Lungs: Clear to auscultation BL without rales, rhonchi, wheezing, crackles or diminished breath sounds.  Abdomen: Soft, nondistended, nontender. No rigidity, guarding, or rebound tenderness.    Medications: [Standing]  atorvastatin 80 milliGRAM(s) Oral at bedtime  finasteride 5 milliGRAM(s) Oral daily  heparin   Injectable 5000 Unit(s) SubCutaneous every 8 hours  hydrochlorothiazide 50 milliGRAM(s) Oral daily  insulin lispro (ADMELOG) corrective regimen sliding scale   SubCutaneous three times a day before meals  losartan 100 milliGRAM(s) Oral daily  metoprolol succinate  milliGRAM(s) Oral daily  tamsulosin 0.8 milliGRAM(s) Oral at bedtime    Medications:[PRN]  acetaminophen   Tablet .. 650 milliGRAM(s) Oral every 6 hours PRN    Labs:                        12.6   11.85 )-----------( 139      ( 14 May 2021 22:00 )             37.4   05-14    138  |  101  |  35<H>  ----------------------------<  275<H>  3.5   |  22  |  1.7<H>    Ca    8.5      14 May 2021 22:00  Phos  2.8     05-14  Mg     2.2     05-14    TPro  5.7<L>  /  Alb  3.7  /  TBili  1.7<H>  /  DBili  0.5<H>  /  AST  20  /  ALT  17  /  AlkPhos  57  05-13  LIVER FUNCTIONS - ( 13 May 2021 21:20 )  Alb: 3.7 g/dL / Pro: 5.7 g/dL / ALK PHOS: 57 U/L / ALT: 17 U/L / AST: 20 U/L / GGT: x           Micro/Urine:    Imaging:  None/24h      
GENERAL SURGERY PROGRESS NOTE     NGOZI RANDHAWA  71y  Male  Hospital day :2d  POD:  Procedure:   OVERNIGHT EVENTS: ECHO with 55-60%. Mod risk for lap keith per cards    T(F): 96.9 (05-14-21 @ 08:16), Max: 100.8 (05-13-21 @ 17:00)  HR: 104 (05-14-21 @ 08:23) (92 - 113)  BP: 138/88 (05-14-21 @ 08:23) (116/58 - 138/88)  ABP: --  ABP(mean): --  RR: 19 (05-14-21 @ 08:23) (11 - 20)  SpO2: 98% (05-14-21 @ 08:23) (92% - 98%)    DIET/FLUIDS:   NG:                                                                                DRAINS:     BM:     EMESIS:     URINE:      GI proph:    AC/ proph:   ABx:     PHYSICAL EXAM:  GENERAL: NAD, well-appearing  CHEST/LUNG: Clear to auscultation bilaterally  HEART: Regular rate and rhythm  ABDOMEN: Soft, Nontender, Nondistended;   EXTREMITIES:  No clubbing, cyanosis, or edema      LABS  Labs:  CAPILLARY BLOOD GLUCOSE      POCT Blood Glucose.: 268 mg/dL (14 May 2021 03:37)  POCT Blood Glucose.: 233 mg/dL (14 May 2021 00:07)  POCT Blood Glucose.: 190 mg/dL (13 May 2021 16:25)  POCT Blood Glucose.: 236 mg/dL (13 May 2021 13:04)  POCT Blood Glucose.: 302 mg/dL (13 May 2021 11:33)                          14.1   13.99 )-----------( 145      ( 13 May 2021 21:20 )             41.4       Auto Neutrophil %: 86.4 % (05-13-21 @ 21:20)  Auto Immature Granulocyte %: 1.5 % (05-13-21 @ 21:20)    05-13    137  |  99  |  23<H>  ----------------------------<  186<H>  3.5   |  23  |  1.4      Calcium, Total Serum: 8.7 mg/dL (05-13-21 @ 21:20)      LFTs:             5.7  | 1.7  | 20       ------------------[57      ( 13 May 2021 21:20 )  3.7  | 0.5  | 17          Lipase:x      Amylase:x         Lactate, Blood: 1.1 mmol/L (05-13-21 @ 21:20)  Lactate, Blood: 1.5 mmol/L (05-13-21 @ 00:30)  Lactate, Blood: 2.5 mmol/L (05-12-21 @ 12:08)      Coags:     13.80  ----< 1.20    ( 12 May 2021 20:02 )     28.5        CARDIAC MARKERS ( 12 May 2021 12:08 )  x     / <0.01 ng/mL / x     / x     / x                      RADIOLOGY & ADDITIONAL TESTS:

## 2021-05-17 PROBLEM — E11.9 TYPE 2 DIABETES MELLITUS WITHOUT COMPLICATIONS: Chronic | Status: ACTIVE | Noted: 2021-05-12

## 2021-05-17 PROBLEM — I10 ESSENTIAL (PRIMARY) HYPERTENSION: Chronic | Status: ACTIVE | Noted: 2021-05-12

## 2021-05-17 LAB — SURGICAL PATHOLOGY STUDY: SIGNIFICANT CHANGE UP

## 2021-05-25 ENCOUNTER — APPOINTMENT (OUTPATIENT)
Dept: SURGERY | Facility: CLINIC | Age: 71
End: 2021-05-25
Payer: MEDICARE

## 2021-05-25 ENCOUNTER — APPOINTMENT (OUTPATIENT)
Dept: PULMONOLOGY | Facility: CLINIC | Age: 71
End: 2021-05-25

## 2021-05-25 VITALS
WEIGHT: 185 LBS | HEIGHT: 68 IN | DIASTOLIC BLOOD PRESSURE: 60 MMHG | BODY MASS INDEX: 28.04 KG/M2 | TEMPERATURE: 97.1 F | SYSTOLIC BLOOD PRESSURE: 100 MMHG | HEART RATE: 88 BPM

## 2021-05-25 DIAGNOSIS — Z79.84 LONG TERM (CURRENT) USE OF ORAL HYPOGLYCEMIC DRUGS: ICD-10-CM

## 2021-05-25 DIAGNOSIS — K80.00 CALCULUS OF GALLBLADDER WITH ACUTE CHOLECYSTITIS WITHOUT OBSTRUCTION: ICD-10-CM

## 2021-05-25 DIAGNOSIS — K46.9 UNSPECIFIED ABDOMINAL HERNIA W/OUT OBSTRUCTION OR GANGRENE: ICD-10-CM

## 2021-05-25 DIAGNOSIS — Z78.9 OTHER SPECIFIED HEALTH STATUS: ICD-10-CM

## 2021-05-25 DIAGNOSIS — N18.31 CHRONIC KIDNEY DISEASE, STAGE 3A: ICD-10-CM

## 2021-05-25 DIAGNOSIS — E78.5 HYPERLIPIDEMIA, UNSPECIFIED: ICD-10-CM

## 2021-05-25 DIAGNOSIS — K82.A1 GANGRENE OF GALLBLADDER IN CHOLECYSTITIS: ICD-10-CM

## 2021-05-25 DIAGNOSIS — Z00.00 ENCOUNTER FOR GENERAL ADULT MEDICAL EXAMINATION W/OUT ABNORMAL FINDINGS: ICD-10-CM

## 2021-05-25 DIAGNOSIS — Z83.3 FAMILY HISTORY OF DIABETES MELLITUS: ICD-10-CM

## 2021-05-25 DIAGNOSIS — I12.9 HYPERTENSIVE CHRONIC KIDNEY DISEASE WITH STAGE 1 THROUGH STAGE 4 CHRONIC KIDNEY DISEASE, OR UNSPECIFIED CHRONIC KIDNEY DISEASE: ICD-10-CM

## 2021-05-25 DIAGNOSIS — E11.22 TYPE 2 DIABETES MELLITUS WITH DIABETIC CHRONIC KIDNEY DISEASE: ICD-10-CM

## 2021-05-25 DIAGNOSIS — I10 ESSENTIAL (PRIMARY) HYPERTENSION: ICD-10-CM

## 2021-05-25 DIAGNOSIS — R39.15 URGENCY OF URINATION: ICD-10-CM

## 2021-05-25 DIAGNOSIS — E11.65 TYPE 2 DIABETES MELLITUS WITH HYPERGLYCEMIA: ICD-10-CM

## 2021-05-25 DIAGNOSIS — E11.9 TYPE 2 DIABETES MELLITUS W/OUT COMPLICATIONS: ICD-10-CM

## 2021-05-25 PROCEDURE — 99024 POSTOP FOLLOW-UP VISIT: CPT

## 2021-05-25 RX ORDER — ICOSAPENT ETHYL 1 G/1
1 CAPSULE ORAL
Qty: 240 | Refills: 0 | Status: ACTIVE | COMMUNITY
Start: 2021-01-03

## 2021-05-25 RX ORDER — AMOXICILLIN AND CLAVULANATE POTASSIUM 875; 125 MG/1; MG/1
875-125 TABLET, COATED ORAL
Qty: 14 | Refills: 0 | Status: ACTIVE | COMMUNITY
Start: 2021-05-14

## 2021-05-25 RX ORDER — DAPAGLIFLOZIN 10 MG/1
10 TABLET, FILM COATED ORAL
Qty: 30 | Refills: 0 | Status: ACTIVE | COMMUNITY
Start: 2021-05-10

## 2021-05-25 RX ORDER — FAMOTIDINE 40 MG/1
40 TABLET, FILM COATED ORAL
Qty: 90 | Refills: 0 | Status: ACTIVE | COMMUNITY
Start: 2021-05-10

## 2021-05-25 RX ORDER — CHLORTHALIDONE 25 MG/1
25 TABLET ORAL
Qty: 90 | Refills: 0 | Status: ACTIVE | COMMUNITY
Start: 2020-11-26

## 2021-05-25 RX ORDER — OMEPRAZOLE 20 MG/1
20 CAPSULE, DELAYED RELEASE ORAL
Qty: 180 | Refills: 0 | Status: ACTIVE | COMMUNITY
Start: 2020-09-02

## 2021-05-25 RX ORDER — METOPROLOL SUCCINATE 100 MG/1
100 TABLET, EXTENDED RELEASE ORAL
Qty: 30 | Refills: 0 | Status: ACTIVE | COMMUNITY
Start: 2021-04-07

## 2021-05-25 RX ORDER — ROSUVASTATIN CALCIUM 20 MG/1
20 TABLET, FILM COATED ORAL
Qty: 90 | Refills: 0 | Status: ACTIVE | COMMUNITY
Start: 2021-03-26

## 2021-05-25 RX ORDER — METFORMIN HYDROCHLORIDE 1000 MG/1
1000 TABLET, COATED ORAL
Qty: 180 | Refills: 0 | Status: ACTIVE | COMMUNITY
Start: 2020-09-28

## 2021-05-25 RX ORDER — CANDESARTAN CILEXETIL 32 MG/1
32 TABLET ORAL
Qty: 90 | Refills: 0 | Status: ACTIVE | COMMUNITY
Start: 2020-11-26

## 2021-05-25 RX ORDER — LANCETS
EACH MISCELLANEOUS
Qty: 200 | Refills: 0 | Status: ACTIVE | COMMUNITY
Start: 2020-10-12

## 2021-05-25 RX ORDER — GLIPIZIDE 5 MG/1
5 TABLET ORAL
Qty: 270 | Refills: 0 | Status: ACTIVE | COMMUNITY
Start: 2021-03-30

## 2021-05-25 RX ORDER — AMOXICILLIN AND CLAVULANATE POTASSIUM 500; 125 MG/1; MG/1
500-125 TABLET, FILM COATED ORAL
Qty: 20 | Refills: 0 | Status: ACTIVE | COMMUNITY
Start: 2021-05-11

## 2021-05-25 RX ORDER — OXYCODONE HYDROCHLORIDE 5 MG/1
5 CAPSULE ORAL
Qty: 10 | Refills: 0 | Status: ACTIVE | COMMUNITY
Start: 2021-05-14

## 2021-05-25 RX ORDER — SODIUM SULFATE, POTASSIUM SULFATE, MAGNESIUM SULFATE 17.5; 3.13; 1.6 G/ML; G/ML; G/ML
17.5-3.13-1.6 SOLUTION, CONCENTRATE ORAL
Qty: 354 | Refills: 0 | Status: ACTIVE | COMMUNITY
Start: 2021-04-14

## 2021-05-25 NOTE — PHYSICAL EXAM
[JVD] : no jugular venous distention  [Normal Breath Sounds] : Normal breath sounds [Abdominal Masses] : No abdominal masses [Abdomen Tenderness] : ~T ~M No abdominal tenderness [No HSM] : no hepatosplenomegaly [No Rash or Lesion] : No rash or lesion [Alert] : alert [Oriented to Person] : oriented to person [Oriented to Place] : oriented to place [Oriented to Time] : oriented to time [Calm] : calm [de-identified] : appears well , tolerating diet , denies fever or chills  [de-identified] : DEANDRA [de-identified] : incisions healed , no bulge or cellulitis

## 2021-07-22 ENCOUNTER — RX RENEWAL (OUTPATIENT)
Age: 71
End: 2021-07-22

## 2021-08-29 ENCOUNTER — LABORATORY RESULT (OUTPATIENT)
Age: 71
End: 2021-08-29

## 2021-08-29 ENCOUNTER — OUTPATIENT (OUTPATIENT)
Dept: OUTPATIENT SERVICES | Facility: HOSPITAL | Age: 71
LOS: 1 days | Discharge: HOME | End: 2021-08-29

## 2021-08-29 DIAGNOSIS — Z11.59 ENCOUNTER FOR SCREENING FOR OTHER VIRAL DISEASES: ICD-10-CM

## 2021-08-29 DIAGNOSIS — Z98.890 OTHER SPECIFIED POSTPROCEDURAL STATES: Chronic | ICD-10-CM

## 2021-09-01 ENCOUNTER — APPOINTMENT (OUTPATIENT)
Age: 71
End: 2021-09-01
Payer: MEDICARE

## 2021-09-01 PROCEDURE — 94729 DIFFUSING CAPACITY: CPT

## 2021-09-01 PROCEDURE — 94010 BREATHING CAPACITY TEST: CPT

## 2021-09-01 PROCEDURE — 94727 GAS DIL/WSHOT DETER LNG VOL: CPT

## 2021-10-20 ENCOUNTER — RX RENEWAL (OUTPATIENT)
Age: 71
End: 2021-10-20

## 2021-11-18 LAB
PSA FREE FLD-MCNC: 18 %
PSA FREE SERPL-MCNC: 0.23 NG/ML
PSA SERPL-MCNC: 1.27 NG/ML

## 2021-11-23 ENCOUNTER — APPOINTMENT (OUTPATIENT)
Dept: UROLOGY | Facility: CLINIC | Age: 71
End: 2021-11-23
Payer: MEDICARE

## 2021-11-23 DIAGNOSIS — R39.9 UNSPECIFIED SYMPTOMS AND SIGNS INVOLVING THE GENITOURINARY SYSTEM: ICD-10-CM

## 2021-11-23 PROCEDURE — 99213 OFFICE O/P EST LOW 20 MIN: CPT

## 2021-11-23 NOTE — HISTORY OF PRESENT ILLNESS
[FreeTextEntry1] : Patient is 71 year old male with history of BPH and Lower Urinary Tract Symptoms. Patient is managed on Avodart and Rapaflo 8 mg daily. Patient reports no change in urination over last year. Patient states he still gets urgency but is not bothered by this. Patient denies dysuria and gross hematuria. \par Last year PSA was 1.5 ng/mL\par This year 2021 PSA is 1.27 ng/mL. Stable.

## 2021-11-23 NOTE — ASSESSMENT
[FreeTextEntry1] : Stable BPH and PSA. \par \par Patient to follow up 1 year  [Urinary Symptom or Sign (788.99\R39.89)] : implantation

## 2021-11-23 NOTE — PHYSICAL EXAM
[General Appearance - In No Acute Distress] : no acute distress [Prostate Tenderness] : the prostate was not tender [No Prostate Nodules] : no prostate nodules [Prostate Size ___ (0-4)] : prostate size [unfilled] (scale: 0-4) [] : no respiratory distress [Oriented To Time, Place, And Person] : oriented to person, place, and time [Normal Station and Gait] : the gait and station were normal for the patient's age [No Focal Deficits] : no focal deficits

## 2021-11-23 NOTE — END OF VISIT
[FreeTextEntry3] : I participated in the obtaining of history, supervised the physical exam, formulated a treatment plan which I discussed with the patient and agree with the above transcription by the physicians assistant

## 2021-12-01 ENCOUNTER — RX RENEWAL (OUTPATIENT)
Age: 71
End: 2021-12-01

## 2022-11-17 LAB
PSA FREE FLD-MCNC: 22 %
PSA FREE SERPL-MCNC: 0.22 NG/ML
PSA SERPL-MCNC: 1 NG/ML

## 2022-11-28 ENCOUNTER — RX RENEWAL (OUTPATIENT)
Age: 72
End: 2022-11-28

## 2022-11-29 ENCOUNTER — APPOINTMENT (OUTPATIENT)
Dept: UROLOGY | Facility: CLINIC | Age: 72
End: 2022-11-29

## 2022-11-29 VITALS
HEART RATE: 74 BPM | HEIGHT: 68 IN | WEIGHT: 185 LBS | BODY MASS INDEX: 28.04 KG/M2 | DIASTOLIC BLOOD PRESSURE: 71 MMHG | SYSTOLIC BLOOD PRESSURE: 127 MMHG

## 2022-11-29 PROCEDURE — 99213 OFFICE O/P EST LOW 20 MIN: CPT

## 2022-11-29 RX ORDER — TAMSULOSIN HYDROCHLORIDE 0.4 MG/1
0.4 CAPSULE ORAL
Qty: 90 | Refills: 3 | Status: COMPLETED | COMMUNITY
Start: 2020-05-01 | End: 2022-11-29

## 2022-11-29 RX ORDER — TAMSULOSIN HYDROCHLORIDE 0.4 MG/1
0.4 CAPSULE ORAL
Qty: 90 | Refills: 0 | Status: COMPLETED | COMMUNITY
Start: 2019-02-12 | End: 2022-11-29

## 2022-11-29 NOTE — END OF VISIT
[FreeTextEntry3] : Patient obtaining history, reviewed labs, formulated treatment plan which I discussed with patient and agree with the above transcription by the physicians assistant

## 2022-11-29 NOTE — HISTORY OF PRESENT ILLNESS
[FreeTextEntry1] : Patient is a 72-year-old male with history of BPH and lower urinary tract symptoms.  Patient is currently managed on silodosin 8 mg daily and Avodart.  He presents for annual follow-up.  Reports no changes in urination over the last year.  He does continue to have nocturia 2 times a night which is not bothersome to the patient.  He denies dysuria and gross hematuria.  PSA 1 year ago was 1.27 ng/mL.  His current PSA in November 2022 is 1.00 ng/mL.

## 2022-11-29 NOTE — ASSESSMENT
[FreeTextEntry1] : Stable BPH and PSA\par Continue silodosin and Avodart\par Follow-up 1 year with repeat PSA

## 2023-04-21 NOTE — PATIENT PROFILE ADULT - VISION (WITH CORRECTIVE LENSES IF THE PATIENT USUALLY WEARS THEM):
H&P reviewed. The patient was examined and there are no changes to the H&P.    TREVER Ron       
Normal vision: sees adequately in most situations; can see medication labels, newsprint

## 2023-07-29 ENCOUNTER — OUTPATIENT (OUTPATIENT)
Dept: OUTPATIENT SERVICES | Facility: HOSPITAL | Age: 73
LOS: 1 days | End: 2023-07-29
Payer: MEDICARE

## 2023-07-29 DIAGNOSIS — Z00.8 ENCOUNTER FOR OTHER GENERAL EXAMINATION: ICD-10-CM

## 2023-07-29 DIAGNOSIS — Z98.890 OTHER SPECIFIED POSTPROCEDURAL STATES: Chronic | ICD-10-CM

## 2023-07-29 DIAGNOSIS — R10.2 PELVIC AND PERINEAL PAIN: ICD-10-CM

## 2023-07-29 DIAGNOSIS — R10.9 UNSPECIFIED ABDOMINAL PAIN: ICD-10-CM

## 2023-07-29 PROCEDURE — 76856 US EXAM PELVIC COMPLETE: CPT | Mod: 26

## 2023-07-29 PROCEDURE — 76856 US EXAM PELVIC COMPLETE: CPT

## 2023-07-29 PROCEDURE — 76700 US EXAM ABDOM COMPLETE: CPT | Mod: 26

## 2023-07-29 PROCEDURE — 76700 US EXAM ABDOM COMPLETE: CPT

## 2023-07-30 DIAGNOSIS — R10.2 PELVIC AND PERINEAL PAIN: ICD-10-CM

## 2023-07-30 DIAGNOSIS — R10.9 UNSPECIFIED ABDOMINAL PAIN: ICD-10-CM

## 2023-09-07 ENCOUNTER — OUTPATIENT (OUTPATIENT)
Dept: OUTPATIENT SERVICES | Facility: HOSPITAL | Age: 73
LOS: 1 days | End: 2023-09-07
Payer: MEDICARE

## 2023-09-07 DIAGNOSIS — R05.9 COUGH, UNSPECIFIED: ICD-10-CM

## 2023-09-07 DIAGNOSIS — Z98.890 OTHER SPECIFIED POSTPROCEDURAL STATES: Chronic | ICD-10-CM

## 2023-09-07 PROCEDURE — 71046 X-RAY EXAM CHEST 2 VIEWS: CPT

## 2023-09-07 PROCEDURE — 71046 X-RAY EXAM CHEST 2 VIEWS: CPT | Mod: 26

## 2023-09-08 DIAGNOSIS — R05.9 COUGH, UNSPECIFIED: ICD-10-CM

## 2023-10-12 NOTE — ED ADULT NURSE NOTE - NS ED NURSE LEVEL OF CONSCIOUSNESS ORIENTATION
Patient is a&ox4, reports ostomy pouch was changed this AM.  Patient is a&ox4, states she is not ready for ostomy teaching today, feels nauseous, but agreeable to review of ostomy basics and wound care. Reviewed step by step wound care of arm and abdominal blisters, wound dressings changed. Reviewed how to open and empty ostomy pouch, how to remove pouch with pull push technique, consistency of stool, frequency of emptying and pouch change. Reviewed importance of hydration. Patient verbalized understanding.     RLQ ileostomy - 2 piece drainable ostomy pouch intact with green mushy stool.  Oriented - self; Oriented - place; Oriented - time

## 2023-11-06 ENCOUNTER — APPOINTMENT (OUTPATIENT)
Dept: PULMONOLOGY | Facility: CLINIC | Age: 73
End: 2023-11-06

## 2023-11-21 ENCOUNTER — RX RENEWAL (OUTPATIENT)
Age: 73
End: 2023-11-21

## 2023-11-21 RX ORDER — DUTASTERIDE 0.5 MG/1
0.5 CAPSULE, LIQUID FILLED ORAL
Qty: 90 | Refills: 3 | Status: ACTIVE | COMMUNITY
Start: 2018-10-05 | End: 1900-01-01

## 2023-11-27 LAB
PSA FREE FLD-MCNC: 19 %
PSA FREE SERPL-MCNC: 0.26 NG/ML
PSA SERPL-MCNC: 1.37 NG/ML

## 2023-11-28 ENCOUNTER — APPOINTMENT (OUTPATIENT)
Dept: UROLOGY | Facility: CLINIC | Age: 73
End: 2023-11-28

## 2023-11-28 ENCOUNTER — APPOINTMENT (OUTPATIENT)
Dept: UROLOGY | Facility: CLINIC | Age: 73
End: 2023-11-28
Payer: MEDICARE

## 2023-11-28 DIAGNOSIS — N13.8 BENIGN PROSTATIC HYPERPLASIA WITH LOWER URINARY TRACT SYMPMS: ICD-10-CM

## 2023-11-28 DIAGNOSIS — R35.0 FREQUENCY OF MICTURITION: ICD-10-CM

## 2023-11-28 DIAGNOSIS — N40.1 BENIGN PROSTATIC HYPERPLASIA WITH LOWER URINARY TRACT SYMPMS: ICD-10-CM

## 2023-11-28 LAB
BILIRUB UR QL STRIP: NORMAL
COLLECTION METHOD: NORMAL
GLUCOSE UR-MCNC: 1000
HCG UR QL: 0.2 EU/DL
HGB UR QL STRIP.AUTO: NORMAL
KETONES UR-MCNC: NORMAL
LEUKOCYTE ESTERASE UR QL STRIP: NORMAL
NITRITE UR QL STRIP: NORMAL
PH UR STRIP: 5.5
PROT UR STRIP-MCNC: NORMAL
SP GR UR STRIP: 1.01

## 2023-11-28 PROCEDURE — 99214 OFFICE O/P EST MOD 30 MIN: CPT

## 2024-01-08 RX ORDER — SILODOSIN 8 MG/1
8 CAPSULE ORAL
Qty: 90 | Refills: 3 | Status: ACTIVE | COMMUNITY
Start: 2020-05-08 | End: 1900-01-01

## 2024-03-12 NOTE — BRIEF OPERATIVE NOTE - TYPE OF ANESTHESIA
Epidural Block    Patient location during procedure: OB  Start time: 3/12/2024 12:45 PM  End time: 3/12/2024 1:05 PM  Reason for block: labor epidural  Staffing  Performed: resident/CRNA   Resident/CRNA: Drake Becerra APRN - CRNA  Performed by: Drake Becerra APRN - CRNA  Authorized by: Chip Oleary MD    Epidural  Patient position: sitting  Prep: ChloraPrep  Patient monitoring: continuous pulse ox and frequent blood pressure checks  Approach: midline  Location: L3-4  Injection technique: JESSICA saline  Provider prep: mask and sterile gloves  Needle  Needle type: Tuohy   Needle gauge: 17 G  Needle length: 3.5 in  Needle insertion depth: 6 cm  Catheter type: multi-orifice  Catheter size: 19 G  Catheter at skin depth: 13 cm  Test dose: negativeCatheter Secured: tegaderm and tape  Assessment  Sensory level: T6  Hemodynamics: stable  Attempts: 1  Outcomes: patient tolerated procedure well  Additional Notes  1645 catheter pulled back to 10 cm at skin due to left side pain despite being on left side the majority of time since placement  Preanesthetic Checklist  Completed: patient identified, IV checked, site marked, risks and benefits discussed, surgical/procedural consents, equipment checked, pre-op evaluation, timeout performed, anesthesia consent given, oxygen available, monitors applied/VS acknowledged, fire risk safety assessment completed and verbalized and blood product R/B/A discussed and consented          
General

## 2024-05-09 ENCOUNTER — NON-APPOINTMENT (OUTPATIENT)
Age: 74
End: 2024-05-09

## 2024-09-24 ENCOUNTER — RX RENEWAL (OUTPATIENT)
Age: 74
End: 2024-09-24

## 2024-11-15 ENCOUNTER — RX RENEWAL (OUTPATIENT)
Age: 74
End: 2024-11-15

## 2024-12-03 ENCOUNTER — APPOINTMENT (OUTPATIENT)
Dept: UROLOGY | Facility: CLINIC | Age: 74
End: 2024-12-03
Payer: MEDICARE

## 2024-12-03 ENCOUNTER — NON-APPOINTMENT (OUTPATIENT)
Age: 74
End: 2024-12-03

## 2024-12-03 DIAGNOSIS — N40.1 BENIGN PROSTATIC HYPERPLASIA WITH LOWER URINARY TRACT SYMPMS: ICD-10-CM

## 2024-12-03 DIAGNOSIS — R39.9 UNSPECIFIED SYMPTOMS AND SIGNS INVOLVING THE GENITOURINARY SYSTEM: ICD-10-CM

## 2024-12-03 DIAGNOSIS — N13.8 BENIGN PROSTATIC HYPERPLASIA WITH LOWER URINARY TRACT SYMPMS: ICD-10-CM

## 2024-12-03 LAB
BILIRUB UR QL STRIP: NORMAL
COLLECTION METHOD: NORMAL
GLUCOSE UR-MCNC: 1000
HCG UR QL: 0.2 EU/DL
HGB UR QL STRIP.AUTO: NORMAL
KETONES UR-MCNC: NORMAL
LEUKOCYTE ESTERASE UR QL STRIP: NORMAL
NITRITE UR QL STRIP: NORMAL
PH UR STRIP: 6
PROT UR STRIP-MCNC: NORMAL
SP GR UR STRIP: 1.01

## 2024-12-03 PROCEDURE — G2211 COMPLEX E/M VISIT ADD ON: CPT

## 2024-12-03 PROCEDURE — 99214 OFFICE O/P EST MOD 30 MIN: CPT

## 2024-12-19 NOTE — ED ADULT TRIAGE NOTE - PATIENT ON (OXYGEN DELIVERY METHOD)
Follow up patient note  Interventional spine and Pain  Physiatry (physical medicine and  Rehabilitation)       Chief complaint:   Chief Complaint   Patient presents with    Follow-Up     Knee pain          HISTORY    Please see new patient note by Dr Echols,  for more details.     HPI  Patient identification: Nancy Gregory ,  1951,   With Diagnoses of Bilateral chronic knee pain, Arthritis of knee, Synovial cyst of popliteal space (Baker), right knee, Lumbar disc herniation, Lumbar spondylosis, Lumbar radiculopathy, BMI 30-39.9, and Exercise counseling were pertinent to this visit.       Verbal consent was obtained for Pasquale copilot : Yes      History of Present Illness  The patient is a 72-year-old female presenting for a follow-up visit due to pain and edema in the right knee, with concerns regarding a popliteal cyst.    The patient initially consulted Dr. Matute with a palpable mass in the posterior aspect of her right knee, accompanied by swelling and radiating pain along the medial aspect of her leg. The pain was severe, rendering the area hypersensitive to touch. A differential diagnosis included a potential blood clot, which was subsequently ruled out due to the patient's unavailability for testing until after Thanksgiving. Following Thanksgiving, she reported symptomatic improvement, although the mass remained palpable. She denies pain during ambulation or stair descent but describes a sensation of stiffness and fullness in the knee, first noticed during hamstring stretching exercises. She expresses concern about exacerbating her condition through physical activity and inquires about the use of a knee brace. Additionally, she questions the likelihood of cyst rupture and the associated symptoms. Prolonged walking exacerbates her condition. She has been managing the pain with meloxicam, administered every 2 to 3 days, which provides temporary relief. Discontinuation of meloxicam results in  "increased pain, a squishy sensation, and difficulty with knee flexion. The patient maintains a bi-weekly exercise regimen with a , focusing on upper body exercises since the onset of her symptoms.    MEDICATIONS  - Meloxicam         ROS Red Flags :   Fever, Chills, Sweats: Denies  Involuntary Weight Loss: Denies  Bowel/Bladder Incontinence: Denies  Saddle Anesthesia: Denies        PMHx:   Past Medical History:   Diagnosis Date    Acute cystitis with hematuria 04/20/2020    Urine dipstick performed in clinic demonstrated trace glucose, 1+ bili, 1+ ketones, specific gravity 1.015, trace intact blood, pH 5.5, 2+ protein, 2.0 urobilinogen, positive nitrite, 3+ leukocyte esterase.  She performed a telemedicine visit yesterday and was placed on cephalosporin for presumptive urinary tract infection.  She was also given Pyridium due to discomfort with dysuria.  She thinks s    Asthma     Back muscle spasm 12/08/2021    On the evening of November 30th she reached to the side and immediately had pain in her low back. This was excruciating and caused complete immobility. [facility] who provided baclofen, toradol shot, and ibuprofen. 1 week later she is feeling about 60% better, she continues to take the baclofen 3-4 times daily and has reduced ibuprofen to 400 mg twice daily. She is tired with the b    Back pain     Burning mouth syndrome- working diagnosis 04/20/2020    She reports prior lanap (laser treatment for peridontal disease) procedure in late January 2020.  This was done on her right upper gums.  2 weeks following the procedure she had a full liquid diet/soft food diet and this was advanced on Valentine's Day dinner, February 14, 2020.  On the following Monday, February 17, 2020 she notes development of \"bloodshot \"gums causing concern and when she clay    CAD (coronary artery disease)     Chickenpox     COVID-19 virus infection 12/27/2021    She is test positive in our clinic for " COVID-19.  She developed symptoms on December 24 including sore throat, headache, loose stools, and dry cough which has since transitioned into productive cough.  No fevers, chills; may have to letter no new myalgias, no chest pain, no breathing difficulty.  She feels fortunate that her symptoms have not been extremely uncomfortable.  She did receive her kenyon    Elevated coronary artery calcium score 04/21/2021    CT coronary calcium score (3/2021):  Coronary calcification:  LMA - 0.0  LCX - 138  LAD - 108  RCA - 0.0  PDA - 0.0     Total Calcium Score: 246     Percentile: Calcium score is worse than the 75th percentile for the patient's age and sex.     Current regimen: rosuvastatin 10 mg daily and aspirin 81 mg daily          GERD (gastroesophageal reflux disease)     Heartburn     Hordeolum externum of right lower eyelid 02/16/2023    Hyperlipidemia     Hypothyroidism     Insomnia     Trouble going to and staying asleep    Lymphadenopathy 09/16/2021    Mass of left submandibular region 05/26/2021    She reports development of a mass in her chin/neck area.  On exam it appears to be submandibular on the left anterolateral aspect.  It is easily palpable, mobile, and approximately 1 x 1 cm.  It is nontender.  There is some associated inflammation in the region that is visible on inspection.  She has been working with periodontist due to gum disease and wonders if it could be related to her dentit    Mumps     WARD on CPAP 08/24/2021    Painful joint     Pedal edema 12/08/2021    Pedal edema developed in bilateral ankles around December 2021 after she threw out her back and was taking muscle relaxants and less active.  Improvement of left lower extremity with furosemide however right lower extremity continues to have residual edema, approximately 20 to 30% better from where she started.  No prior occurrence of the same.  Injury where she tripped over the door of a dishwash    Rotator cuff arthropathy of left shoulder  01/25/2022    She reports 4 to 6 months of left shoulder pain.  Various maneuvers elicit the pain.  The pain can be over the AC joint superiorly, the deltoid laterally as well as posteriorly.  It became more bothersome as she has been sleeping on her back more since she threw out her back several weeks ago.  No clear injury to the shoulder itself.  Prior history of rotator cuff repair around 2016 by physician w    Stiffness in joint 07/05/2023    She reports fairly diffuse stiffness in the joints.  She will wake up with it often in the morning and can get better but then after sitting for 20 to 30 minutes and can decompensate.  She has in her bilateral hands, bilateral knees, and also her forearms and elbows.  She has associated joint swelling which has been more troublesome.  She tries to limit meloxicam to avoid risk of GI distress or ki    Stomatitis 09/16/2021    Thyroid disease     Tonsillitis     Wears glasses        PSHx:   Past Surgical History:   Procedure Laterality Date    ABDOMINAL HYSTERECTOMY TOTAL      CHOLECYSTECTOMY      HYSTERECTOMY LAPAROSCOPY      TONSILLECTOMY         Family history   Family History   Problem Relation Age of Onset    Cancer Father         lung    Breast Cancer Maternal Aunt     Hypertension Maternal Grandmother     Hypertension Maternal Grandfather     Kidney Disease Mother     Heart Disease Neg Hx          Medications:   Outpatient Medications Marked as Taking for the 12/19/24 encounter (Office Visit) with Carlos Eduardo Echols M.D.   Medication Sig Dispense Refill    meloxicam (MOBIC) 15 MG tablet Take 1 Tablet by mouth every day. 100 Tablet 3    rosuvastatin (CRESTOR) 10 MG Tab TAKE 1 TABLET BY MOUTH EVERY DAY IN THE EVENING 100 Tablet 3    albuterol 108 (90 Base) MCG/ACT Aero Soln inhalation aerosol INHALE 2 PUFFS BY MOUTH EVERY FOUR HOURS AS NEEDED FOR SHORTNESS OF BREATH. 6.7 Each 3    DULoxetine (CYMBALTA) 30 MG Cap DR Particles Take 2 Capsules by mouth every day. 200 Capsule  3    fluticasone-salmeterol (WIXELA INHUB) 250-50 MCG/ACT AEROSOL POWDER, BREATH ACTIVATED Inhale 1 Puff every 12 hours. 60 Each 3    levothyroxine (SYNTHROID) 88 MCG Tab Take 1 Tablet by mouth every Monday, Wednesday, and Friday. To alternate with 100 mcg dose 50 Tablet 3    levothyroxine (SYNTHROID) 100 MCG Tab Take 1 Tablet by mouth in the evening every Tuesday, Thursdays, Saturday, and Sunday. To alternate with 88 mcg dose 60 Tablet 3    Meclizine HCl (ANTIVERT) 25 MG Chew Tab 1 q12hr prn vertigo 10 Tablet 0    PREMARIN 0.625 MG/GM Cream APPLY 1.0 GRAM TO AFFECTED AREA 3X/WEEK      dicyclomine (BENTYL) 10 MG Cap Take 1 Capsule by mouth every 6 hours as needed (abdominal cramping). 30 Capsule 1    neomycin-polymixin-dexamethasone (MAXITROL) 3.5-79894-6.1 Ointment ophthalmic ointment       clobetasol (TEMOVATE) 0.05 % Cream       baclofen (LIORESAL) 10 MG Tab Take 1 Tablet by mouth 3 times a day as needed (muscle spasm/pain). 90 Tablet 3    triamcinolone acetonide (KENALOG) 0.1 % Ointment TOPICAL APPLY SPARINGLY TO THE AFFECTED AREA 3XWK.      ipratropium-albuterol (DUONEB) 0.5-2.5 (3) MG/3ML nebulizer solution Take 3 mL by nebulization 4 times a day. (Patient taking differently: Take 3 mL by nebulization every four hours as needed.) 3 mL 3    coenzyme Q-10 30 MG capsule Take 60 mg by mouth every day.      Cyanocobalamin (B-12 PO) Take 1 Tablet by mouth every day.          Current Outpatient Medications on File Prior to Visit   Medication Sig Dispense Refill    meloxicam (MOBIC) 15 MG tablet Take 1 Tablet by mouth every day. 100 Tablet 3    rosuvastatin (CRESTOR) 10 MG Tab TAKE 1 TABLET BY MOUTH EVERY DAY IN THE EVENING 100 Tablet 3    albuterol 108 (90 Base) MCG/ACT Aero Soln inhalation aerosol INHALE 2 PUFFS BY MOUTH EVERY FOUR HOURS AS NEEDED FOR SHORTNESS OF BREATH. 6.7 Each 3    DULoxetine (CYMBALTA) 30 MG Cap DR Particles Take 2 Capsules by mouth every day. 200 Capsule 3    fluticasone-salmeterol (WIXELA  INHUB) 250-50 MCG/ACT AEROSOL POWDER, BREATH ACTIVATED Inhale 1 Puff every 12 hours. 60 Each 3    levothyroxine (SYNTHROID) 88 MCG Tab Take 1 Tablet by mouth every Monday, Wednesday, and Friday. To alternate with 100 mcg dose 50 Tablet 3    levothyroxine (SYNTHROID) 100 MCG Tab Take 1 Tablet by mouth in the evening every Tuesday, Thursdays, Saturday, and Sunday. To alternate with 88 mcg dose 60 Tablet 3    Meclizine HCl (ANTIVERT) 25 MG Chew Tab 1 q12hr prn vertigo 10 Tablet 0    PREMARIN 0.625 MG/GM Cream APPLY 1.0 GRAM TO AFFECTED AREA 3X/WEEK      dicyclomine (BENTYL) 10 MG Cap Take 1 Capsule by mouth every 6 hours as needed (abdominal cramping). 30 Capsule 1    neomycin-polymixin-dexamethasone (MAXITROL) 3.5-91287-3.1 Ointment ophthalmic ointment       clobetasol (TEMOVATE) 0.05 % Cream       baclofen (LIORESAL) 10 MG Tab Take 1 Tablet by mouth 3 times a day as needed (muscle spasm/pain). 90 Tablet 3    triamcinolone acetonide (KENALOG) 0.1 % Ointment TOPICAL APPLY SPARINGLY TO THE AFFECTED AREA 3XWK.      ipratropium-albuterol (DUONEB) 0.5-2.5 (3) MG/3ML nebulizer solution Take 3 mL by nebulization 4 times a day. (Patient taking differently: Take 3 mL by nebulization every four hours as needed.) 3 mL 3    coenzyme Q-10 30 MG capsule Take 60 mg by mouth every day.      Cyanocobalamin (B-12 PO) Take 1 Tablet by mouth every day.       No current facility-administered medications on file prior to visit.         Allergies:   Allergies   Allergen Reactions    Codeine        Social Hx:   Social History     Socioeconomic History    Marital status:      Spouse name: Not on file    Number of children: Not on file    Years of education: Not on file    Highest education level: Not on file   Occupational History    Not on file   Tobacco Use    Smoking status: Former     Current packs/day: 0.00     Average packs/day: 1 pack/day for 15.0 years (15.0 ttl pk-yrs)     Types: Cigarettes     Start date: 10/10/1978     Quit  date: 1993     Years since quittin.7    Smokeless tobacco: Never    Tobacco comments:     continued abstinence   Vaping Use    Vaping status: Never Used   Substance and Sexual Activity    Alcohol use: Yes     Alcohol/week: 1.8 oz     Types: 3 Glasses of wine per week     Comment: occasionally    Drug use: No    Sexual activity: Yes     Partners: Male     Birth control/protection: Post-Menopausal   Other Topics Concern     Service No    Blood Transfusions No    Caffeine Concern No    Occupational Exposure No    Hobby Hazards No    Sleep Concern Yes    Stress Concern Yes    Weight Concern No    Special Diet No    Back Care No    Exercise Yes    Bike Helmet No    Seat Belt Yes    Self-Exams Yes   Social History Narrative    She worked at [employer] at the [employer], recently retired. She is  to Ryan for the past 20 years, they met at a [employer]. She has a son ([place] [address], [place]) and 2 grandchildren.      Social Drivers of Health     Financial Resource Strain: Low Risk  (2024)    Overall Financial Resource Strain (CARDIA)     Difficulty of Paying Living Expenses: Not hard at all   Food Insecurity: No Food Insecurity (2024)    Hunger Vital Sign     Worried About Running Out of Food in the Last Year: Never true     Ran Out of Food in the Last Year: Never true   Transportation Needs: No Transportation Needs (2024)    PRAPARE - Transportation     Lack of Transportation (Medical): No     Lack of Transportation (Non-Medical): No   Physical Activity: Not on file   Stress: Not on file   Social Connections: Not on file   Intimate Partner Violence: Not on file   Housing Stability: Low Risk  (2024)    Housing Stability Vital Sign     Unable to Pay for Housing in the Last Year: No     Number of Places Lived in the Last Year: 1     Unstable Housing in the Last Year: No         EXAMINATION     Physical Exam:   Vitals: BP (!) 142/74 (BP Location: Right arm, Patient  room air "Position: Sitting, BP Cuff Size: Large adult)   Pulse 93   Temp 36.1 °C (96.9 °F) (Temporal)   Ht 1.651 m (5' 5\")   Wt 85.3 kg (188 lb)   SpO2 94%     Constitutional:   Body Habitus: Body mass index is 31.28 kg/m².  Cooperation: Fully cooperates with exam  Appearance: Well-groomed no disheveled    Respiratory-  breathing comfortable on room air, no audible wheezing  Cardiovascular- capillary refills less than 2 seconds. No lower extremity edema is noted.   Psychiatric- alert and oriented ×3. Normal affect.    MSK and Neuro: -    Physical Exam  There are no signs of infection around the knee. No areas of tenderness to palpation around the knee. A popliteal cyst is palpated in the knee.         MEDICAL DECISION MAKING    DATA    Labs:   Lab Results   Component Value Date/Time    SODIUM 142 07/05/2024 11:21 AM    POTASSIUM 5.1 07/05/2024 11:21 AM    CHLORIDE 106 07/05/2024 11:21 AM    CO2 27 07/05/2024 11:21 AM    GLUCOSE 108 (H) 07/05/2024 11:21 AM    BUN 19 07/05/2024 11:21 AM    CREATININE 0.63 07/05/2024 11:21 AM        No results found for: \"PROTHROMBTM\", \"INR\"     Lab Results   Component Value Date/Time    WBC 5.5 07/05/2024 11:21 AM    RBC 4.90 07/05/2024 11:21 AM    HEMOGLOBIN 14.6 07/05/2024 11:21 AM    HEMATOCRIT 46.2 07/05/2024 11:21 AM    MCV 94.3 07/05/2024 11:21 AM    MCH 29.8 07/05/2024 11:21 AM    MCHC 31.6 (L) 07/05/2024 11:21 AM    MPV 11.5 07/05/2024 11:21 AM    NEUTSPOLYS 49.10 07/05/2024 11:21 AM    LYMPHOCYTES 38.30 07/05/2024 11:21 AM    MONOCYTES 8.00 07/05/2024 11:21 AM    EOSINOPHILS 3.30 07/05/2024 11:21 AM    BASOPHILS 1.10 07/05/2024 11:21 AM        Lab Results   Component Value Date/Time    HBA1C 5.4 02/08/2024 09:05 AM          Imaging:   I personally reviewed following images          Results  - Imaging:    - Limited diagnostic ultrasound of the right knee shows a hypoechoic structure consistent with a popliteal cyst with no signs of vascularity       I reviewed the following " radiology reports                                                                                                    Results for orders placed during the hospital encounter of 21    CT-ABDOMEN-PELVIS WITH    Impression  Findings consistent with sigmoid diverticulitis without abscess or free air.    Aortic atherosclerosis without aneurysm.    Hepatic cyst.                       Results for orders placed during the hospital encounter of 22    DX-CHEST-2 VIEWS    Impression  1.  No evidence of acute cardiopulmonary process.  2.  Mild hyperinflation consistent with history of asthma.                   Results for orders placed during the hospital encounter of 24    DX-KNEE 3 VIEWS LEFT    Impression  1.  No fracture or dislocation of LEFT knee.  2.  Minimal degenerative changes.                            DIAGNOSIS   Visit Diagnoses     ICD-10-CM   1. Bilateral chronic knee pain  M25.561    M25.562    G89.29   2. Arthritis of knee  M17.10   3. Synovial cyst of popliteal space ([place]), right knee  M71.21   4. Lumbar disc herniation  M51.26   5. Lumbar spondylosis  M47.816   6. Lumbar radiculopathy  M54.16   7. BMI 30-39.9  E66.9   8. Exercise counseling  Z71.82         ASSESSMENT and PLAN:     Prudence Chayito Forthun  1951 female      Pru was seen today for follow-up.    Diagnoses and all orders for this visit:    Bilateral chronic knee pain  -     Referral to Physical Therapy    Arthritis of knee  -     Referral to Physical Therapy    Synovial cyst of popliteal space (Baker), right knee  -     Referral to Physical Therapy    Lumbar disc herniation  -     Referral to Physical Therapy    Lumbar spondylosis  -     Referral to Physical Therapy    Lumbar radiculopathy  -     Referral to Physical Therapy    BMI 30-39.9  -     Referral to Physical Therapy    Exercise counseling  -     Referral to Physical Therapy        Assessment & Plan  The popliteal cyst is likely a manifestation of underlying knee  arthritis. It is smaller than previously measured and does not exhibit any signs of nerve damage or significant knee injury. The cyst's fluctuating size may cause irritation. There is a very low risk of infection, which would present as redness, heat, swelling, pain, and fever. Drainage of the cyst is not recommended at this time  due to the high likelihood of recurrence. Physical therapy is advised to strengthen both knees. She is encouraged to continue her upper body workouts and gradually incorporate lower body exercises as tolerated. A soft brace, such as an elastic, stretchy, or neoprene brace, can be used for comfort. An Ace bandage is also an option but may be less comfortable. If the cyst ruptures, which can sometimes be painful, she should rest and take an anti-inflammatory medication. If the cyst becomes infected, she should seek immediate medical attention at the emergency room however this would be rare. A referral to physical therapy has been made, and she can request Charly at physical therapy. She will continue taking meloxicam as needed for pain management.    Follow-up  The patient will follow up in 1 year or sooner as needed.     PROCEDURE  12/19/2024 I performed A limited diagnostic ultrasound of the right knee was performed today, revealing a hypoechoic structure consistent with a popliteal cyst with no signs of vascularity.            Thank you for allowing me to participate in the care of this patient. If you have any questions please not hesitate to contact me.             Please note that this dictation was created using voice recognition software. I have made every reasonable attempt to correct obvious errors but there may be errors of grammar and content that I may have overlooked prior to finalization of this note.      Carlos Eduardo Echols MD  Interventional Spine and Sports Physiatry  Physical Medicine and Rehabilitation  [facility]

## 2024-12-28 ENCOUNTER — OUTPATIENT (OUTPATIENT)
Dept: OUTPATIENT SERVICES | Facility: HOSPITAL | Age: 74
LOS: 1 days | End: 2024-12-28
Payer: MEDICARE

## 2024-12-28 DIAGNOSIS — E61.1 IRON DEFICIENCY: ICD-10-CM

## 2024-12-28 DIAGNOSIS — Z00.8 ENCOUNTER FOR OTHER GENERAL EXAMINATION: ICD-10-CM

## 2024-12-28 DIAGNOSIS — Z98.890 OTHER SPECIFIED POSTPROCEDURAL STATES: Chronic | ICD-10-CM

## 2024-12-28 PROCEDURE — 74177 CT ABD & PELVIS W/CONTRAST: CPT

## 2024-12-28 PROCEDURE — 74177 CT ABD & PELVIS W/CONTRAST: CPT | Mod: 26,MH

## 2024-12-29 DIAGNOSIS — E61.1 IRON DEFICIENCY: ICD-10-CM

## 2025-06-13 ENCOUNTER — RX RENEWAL (OUTPATIENT)
Age: 75
End: 2025-06-13

## 2025-08-23 ENCOUNTER — NON-APPOINTMENT (OUTPATIENT)
Age: 75
End: 2025-08-23

## 2025-09-02 ENCOUNTER — RX RENEWAL (OUTPATIENT)
Age: 75
End: 2025-09-02